# Patient Record
Sex: FEMALE | Race: BLACK OR AFRICAN AMERICAN | Employment: FULL TIME | URBAN - METROPOLITAN AREA
[De-identification: names, ages, dates, MRNs, and addresses within clinical notes are randomized per-mention and may not be internally consistent; named-entity substitution may affect disease eponyms.]

---

## 2017-04-18 ENCOUNTER — HOSPITAL ENCOUNTER (EMERGENCY)
Age: 27
Discharge: HOME | End: 2017-04-18
Attending: EMERGENCY MEDICINE
Payer: MEDICAID

## 2017-04-18 VITALS
HEIGHT: 65 IN | OXYGEN SATURATION: 99 % | TEMPERATURE: 98.4 F | HEART RATE: 93 BPM | SYSTOLIC BLOOD PRESSURE: 128 MMHG | RESPIRATION RATE: 16 BRPM | DIASTOLIC BLOOD PRESSURE: 74 MMHG | WEIGHT: 128.5 LBS | BODY MASS INDEX: 21.41 KG/M2

## 2017-04-18 DIAGNOSIS — L30.9 DERMATITIS: Primary | ICD-10-CM

## 2017-04-18 PROCEDURE — 99283 EMERGENCY DEPT VISIT LOW MDM: CPT | Performed by: PHYSICIAN ASSISTANT

## 2017-04-18 PROCEDURE — 74011250637 HC RX REV CODE- 250/637: Performed by: PHYSICIAN ASSISTANT

## 2017-04-18 RX ORDER — DEXAMETHASONE SODIUM PHOSPHATE 100 MG/10ML
10 INJECTION INTRAMUSCULAR; INTRAVENOUS
Status: COMPLETED | OUTPATIENT
Start: 2017-04-18 | End: 2017-04-18

## 2017-04-18 RX ORDER — TRIAMCINOLONE ACETONIDE 1 MG/G
OINTMENT TOPICAL 2 TIMES DAILY
Qty: 80 G | Refills: 0 | Status: SHIPPED | OUTPATIENT
Start: 2017-04-18 | End: 2017-11-13

## 2017-04-18 RX ORDER — PREDNISONE 10 MG/1
10 TABLET ORAL
Qty: 45 TAB | Refills: 0 | Status: SHIPPED | OUTPATIENT
Start: 2017-04-18 | End: 2017-11-13

## 2017-04-18 RX ORDER — HYDROXYZINE PAMOATE 25 MG/1
25 CAPSULE ORAL
Qty: 30 CAP | Refills: 0 | Status: SHIPPED | OUTPATIENT
Start: 2017-04-18 | End: 2017-11-13

## 2017-04-18 RX ADMIN — DEXAMETHASONE SODIUM PHOSPHATE 10 MG: 10 INJECTION INTRAMUSCULAR; INTRAVENOUS at 16:49

## 2017-04-18 NOTE — ED TRIAGE NOTES
Patient presents with a rash over her upper extremities that has spread to the trunk.  She has been itching with this rash

## 2017-04-18 NOTE — DISCHARGE INSTRUCTIONS

## 2017-04-18 NOTE — ED NOTES
I have reviewed discharge instructions with the patient. Printed instructions, follow-up information, and a prescription. The patient verbalized understanding. Left ambulatory in no acute distress.

## 2017-04-19 NOTE — ED PROVIDER NOTES
HPI Comments: Patient is here with a very itchy rash that started 1-2 weeks ago. She states it started on her abdominal wall around her belly button and has spread all over her trunk and arms. It is not on her legs. She states she has tried some Aveeno baths which have helped temporarily. She does live with her mother who does not have any similar symptoms. Patient has no chest pain, shortness of breath, abdominal pain, trouble with urination or bowel movements, vaginal discharge, fever or other symptoms. She is unsure if she was exposed to something she may be allergic to or not. This is never happened before. Patient is a 27 y.o. female presenting with rash. The history is provided by the patient. Rash    This is a new problem. The current episode started more than 1 week ago. The problem has been gradually worsening. The problem is associated with an unknown factor. There has been no fever. The rash is present on the chest, left arm and right arm. The pain is at a severity of 0/10. The patient is experiencing no pain. Associated symptoms include itching and hives. Pertinent negatives include no blisters, no pain and no weeping. She has tried oral antihistamines for the symptoms. The treatment provided mild relief. Past Medical History:   Diagnosis Date    Ectopic pregnancy, tubal, right, unruptured 12/6/2010       Past Surgical History:   Procedure Laterality Date    HX GYN  2014    Righ tube removed.      HX GYN  2009    Left tube removed    HX OTHER SURGICAL      HX WISDOM TEETH EXTRACTION      uncomplicated         Family History:   Problem Relation Age of Onset    Thyroid Disease Mother     Hypertension Mother     No Known Problems Father     Hypertension Sister     Heart Disease Paternal Aunt     Cancer Paternal Aunt     Heart Disease Paternal Grandmother     Cancer Paternal Grandfather        Social History     Social History    Marital status: SINGLE     Spouse name: N/A   [facility] Number of children: 1    Years of education: N/A     Occupational History          works at [address] [place] [address] Road History Main Topics    Smoking status: Current Every Day Smoker     Packs/day: 0.10     Types: Cigarettes    Smokeless tobacco: Never Used    Alcohol use Yes      Comment: occasionally    Drug use: No    Sexual activity: Yes     Partners: Male     Birth control/ protection: None     Other Topics Concern    Not on file     Social History Narrative         ALLERGIES: Reglan [metoclopramide]    Review of Systems   Constitutional: Negative. HENT: Negative. Eyes: Negative. Respiratory: Negative. Cardiovascular: Negative. Gastrointestinal: Negative. Genitourinary: Negative. Musculoskeletal: Negative. Skin: Positive for color change, itching and rash. Neurological: Negative. Psychiatric/Behavioral: Negative. All other systems reviewed and are negative. Vitals:    04/18/17 1632   BP: 128/74   Pulse: 93   Resp: 16   Temp: 98.4 °F (36.9 °C)   SpO2: 99%   Weight: 58.3 kg (128 lb 8 oz)   Height: 5' 5\" (1.651 m)            Physical Exam   Constitutional: She is oriented to person, place, and time. She appears well-developed and well-nourished. HENT:   Head: Normocephalic and atraumatic. Right Ear: External ear normal.   Left Ear: External ear normal.   Nose: Nose normal.   Mouth/Throat: Oropharynx is clear and moist.   Eyes: Conjunctivae and EOM are normal. Pupils are equal, round, and reactive to light. Neck: Normal range of motion. Neck supple. Cardiovascular: Normal rate, regular rhythm, normal heart sounds and intact distal pulses. Pulmonary/Chest: Effort normal and breath sounds normal.   Abdominal: Soft. Bowel sounds are normal.   Musculoskeletal: Normal range of motion. Neurological: She is alert and oriented to person, place, and time. She has normal reflexes. Skin: Skin is warm and dry. Rash noted. There is erythema.         Psychiatric: She has a normal mood and affect. Her behavior is normal. Judgment and thought content normal.   Nursing note and vitals reviewed. MDM  Number of Diagnoses or Management Options  Dermatitis: new and requires workup  Risk of Complications, Morbidity, and/or Mortality  Presenting problems: moderate  Diagnostic procedures: moderate  Management options: moderate    Patient Progress  Patient progress: stable    ED Course       Procedures      The patient was observed in the ED. Patient was given a referral to a dermatologist for follow-up if this does not resolve. We did give her 1 dose of Decadron here in the emergency room with a round of prednisone at home. I have also prescribed some Vistaril for the itching and some Kenalog cream to use on the areas. She should return to the ED if worsening in any way, shortness of breath, chest pain or new symptoms. Drink plenty of water. Finish the prednisone as directed. I discussed the results of all labs, procedures, radiographs, and treatments with the patient and available family. Treatment plan is agreed upon and the patient is ready for discharge. All voiced understanding of the discharge plan and medication instructions or changes as appropriate. Questions about treatment in the ED were answered. All were encouraged to return should symptoms worsen or new problems develop.

## 2017-11-13 ENCOUNTER — HOSPITAL ENCOUNTER (EMERGENCY)
Age: 27
Discharge: HOME | End: 2017-11-13
Attending: EMERGENCY MEDICINE
Payer: SELF-PAY

## 2017-11-13 ENCOUNTER — APPOINTMENT (OUTPATIENT)
Dept: GENERAL RADIOLOGY | Age: 27
End: 2017-11-13
Attending: EMERGENCY MEDICINE
Payer: SELF-PAY

## 2017-11-13 VITALS
SYSTOLIC BLOOD PRESSURE: 135 MMHG | TEMPERATURE: 98.6 F | OXYGEN SATURATION: 99 % | RESPIRATION RATE: 16 BRPM | DIASTOLIC BLOOD PRESSURE: 84 MMHG | HEART RATE: 76 BPM

## 2017-11-13 DIAGNOSIS — J20.9 ACUTE BRONCHITIS DUE TO INFECTION: Primary | ICD-10-CM

## 2017-11-13 LAB
DEPRECATED S PYO AG THROAT QL EIA: NEGATIVE
HETEROPH AB SER QL: NEGATIVE

## 2017-11-13 PROCEDURE — 87081 CULTURE SCREEN ONLY: CPT | Performed by: NURSE PRACTITIONER

## 2017-11-13 PROCEDURE — 86308 HETEROPHILE ANTIBODY SCREEN: CPT | Performed by: NURSE PRACTITIONER

## 2017-11-13 PROCEDURE — 87880 STREP A ASSAY W/OPTIC: CPT | Performed by: NURSE PRACTITIONER

## 2017-11-13 PROCEDURE — 71020 XR CHEST PA LAT: CPT

## 2017-11-13 PROCEDURE — 99283 EMERGENCY DEPT VISIT LOW MDM: CPT | Performed by: NURSE PRACTITIONER

## 2017-11-13 RX ORDER — PREDNISONE 20 MG/1
20 TABLET ORAL DAILY
Qty: 11 TAB | Refills: 0 | Status: SHIPPED | OUTPATIENT
Start: 2017-11-13 | End: 2017-11-23

## 2017-11-13 RX ORDER — DOXYCYCLINE HYCLATE 100 MG
100 TABLET ORAL 2 TIMES DAILY
Qty: 14 TAB | Refills: 0 | Status: SHIPPED | OUTPATIENT
Start: 2017-11-13 | End: 2017-11-20

## 2017-11-13 RX ORDER — ALBUTEROL SULFATE 90 UG/1
2 AEROSOL, METERED RESPIRATORY (INHALATION)
Qty: 1 INHALER | Refills: 0 | Status: SHIPPED | OUTPATIENT
Start: 2017-11-13 | End: 2018-08-10

## 2017-11-13 RX ORDER — GUAIFENESIN 600 MG/1
600 TABLET, EXTENDED RELEASE ORAL 2 TIMES DAILY
Qty: 20 TAB | Refills: 0 | Status: SHIPPED | OUTPATIENT
Start: 2017-11-13 | End: 2018-08-10

## 2017-11-13 NOTE — ED PROVIDER NOTES
HPI Comments: 31 y/o f to ed with complaint of sore throat for one month, now with cough. She coughs so hard at times she vomits or has streaks of blood in her sputum w cough. Some mild sinus drainage. Chills, no fever. No ear pain. No sinus tenderness. No chest pain, wheezing. Hoarse voice is present however. lmp ended yesterday regular and on time. Patient is a 32 y.o. female presenting with sore throat. The history is provided by the patient. No  was used. Sore Throat    This is a recurrent problem. Episode onset: one month. The problem has not changed since onset. Patient reports a subjective fever - was not measured. Associated symptoms include cough. Pertinent negatives include no diarrhea, no vomiting, no congestion, no drooling, no ear discharge, no ear pain, no headaches, no plugged ear sensation, no shortness of breath, no stridor, no swollen glands, no trouble swallowing and no stiff neck. Past Medical History:   Diagnosis Date    Ectopic pregnancy, tubal, right, unruptured 12/6/2010    Ill-defined condition     exzema       Past Surgical History:   Procedure Laterality Date    HX GYN  2014    Righ tube removed.      HX GYN  2009    Left tube removed    HX OTHER SURGICAL      HX WISDOM TEETH EXTRACTION      uncomplicated         Family History:   Problem Relation Age of Onset    Heart Disease Paternal Grandmother     Cancer Paternal Grandfather     Thyroid Disease Mother     Hypertension Mother     No Known Problems Father     Hypertension Sister     Heart Disease Paternal Aunt     Cancer Paternal Aunt        Social History     Social History    Marital status: SINGLE     Spouse name: N/A    Number of children: 1    Years of education: N/A     Occupational History          works at [employer] [facility] [employer] [address] History Main Topics    Smoking status: Current Every Day Smoker     Packs/day: 0.10     Types: Cigarettes    Smokeless tobacco: Never Used   [employer] Alcohol use Yes      Comment: occasionally    Drug use: No    Sexual activity: Yes     Partners: Male     Birth control/ protection: None     Other Topics Concern    Not on file     Social History Narrative         ALLERGIES: Reglan [metoclopramide]    Review of Systems   Constitutional: Positive for chills and fever. HENT: Positive for postnasal drip and sore throat. Negative for congestion, drooling, ear discharge, ear pain, sinus pain, sinus pressure and trouble swallowing. Eyes: Negative for discharge and redness. Respiratory: Positive for cough. Negative for choking, shortness of breath, wheezing and stridor. Cardiovascular: Negative for chest pain and palpitations. Gastrointestinal: Negative for abdominal pain, diarrhea, nausea and vomiting. Endocrine: Negative for cold intolerance and heat intolerance. Genitourinary: Negative for difficulty urinating, dysuria and flank pain. Musculoskeletal: Negative for back pain and neck pain. Skin: Negative for pallor and rash. Neurological: Negative for dizziness, weakness and headaches. Psychiatric/Behavioral: Negative for confusion and decreased concentration. Vitals:    11/13/17 1428   BP: 140/87   Pulse: 79   Resp: 18   Temp: 98.9 °F (37.2 °C)   SpO2: 99%            Physical Exam   Constitutional: She is oriented to person, place, and time. She appears well-developed and well-nourished. No distress. HENT:   Head: Normocephalic and atraumatic. Right Ear: External ear normal. No swelling or tenderness. No middle ear effusion. Left Ear: External ear normal. No swelling or tenderness. No middle ear effusion. Nose: Nose normal. Right sinus exhibits no maxillary sinus tenderness and no frontal sinus tenderness. Left sinus exhibits no maxillary sinus tenderness and no frontal sinus tenderness. Mouth/Throat: Oropharynx is clear and moist and mucous membranes are normal. No trismus in the jaw. Normal dentition. No uvula swelling. Eyes: Conjunctivae and EOM are normal. Pupils are equal, round, and reactive to light. Neck: Normal range of motion. Neck supple. Cardiovascular: Normal rate, regular rhythm and normal heart sounds. Pulmonary/Chest: Effort normal and breath sounds normal. No respiratory distress. She has no wheezes. Abdominal: Soft. Bowel sounds are normal. She exhibits no distension. There is no tenderness. Musculoskeletal: Normal range of motion. She exhibits no edema or tenderness. Lymphadenopathy:     She has cervical adenopathy. Neurological: She is alert and oriented to person, place, and time. No cranial nerve deficit. Coordination normal.   Skin: Skin is warm and dry. No rash noted. Psychiatric: She has a normal mood and affect. Her behavior is normal. Judgment and thought content normal.   Nursing note and vitals reviewed. MDM  Number of Diagnoses or Management Options  Diagnosis management comments: 33 y/o f to ed with complaint of sore throat for one month, now with cough. She coughs so hard at times she vomits or has streaks of blood in her sputum w cough. Some mild sinus drainage. Chills, no fever. No ear pain. No sinus tenderness. No chest pain, wheezing. Hoarse voice is present however. lmp ended yesterday regular and on time. Due to ongoing course, will eval mono spot as well as rapid strep  4:33 PM  Reviewed negative findings w pt. She now tells me she has had wheezing off and on with sx as above. Will dc w bronchitis.        Amount and/or Complexity of Data Reviewed  Clinical lab tests: ordered and reviewed  Tests in the radiology section of CPT®: ordered and reviewed    Risk of Complications, Morbidity, and/or Mortality  Presenting problems: minimal  Diagnostic procedures: minimal  Management options: minimal    Patient Progress  Patient progress: stable    ED Course       Procedures

## 2017-11-13 NOTE — DISCHARGE INSTRUCTIONS
Bronchitis: Care Instructions  Your Care Instructions    Bronchitis is inflammation of the bronchial tubes, which carry air to the lungs. The tubes swell and produce mucus, or phlegm. The mucus and inflamed bronchial tubes make you cough. You may have trouble breathing. Most cases of bronchitis are caused by viruses like those that cause colds. Antibiotics usually do not help and they may be harmful. Bronchitis usually develops rapidly and lasts about 2 to 3 weeks in otherwise healthy people. Follow-up care is a key part of your treatment and safety. Be sure to make and go to all appointments, and call your doctor if you are having problems. It's also a good idea to know your test results and keep a list of the medicines you take. How can you care for yourself at home? · Take all medicines exactly as prescribed. Call your doctor if you think you are having a problem with your medicine. · Get some extra rest.  · Take an over-the-counter pain medicine, such as acetaminophen (Tylenol), ibuprofen (Advil, Motrin), or naproxen (Aleve) to reduce fever and relieve body aches. Read and follow all instructions on the label. · Do not take two or more pain medicines at the same time unless the doctor told you to. Many pain medicines have acetaminophen, which is Tylenol. Too much acetaminophen (Tylenol) can be harmful. · Take an over-the-counter cough medicine that contains dextromethorphan to help quiet a dry, hacking cough so that you can sleep. Avoid cough medicines that have more than one active ingredient. Read and follow all instructions on the label. · Breathe moist air from a humidifier, hot shower, or sink filled with hot water. The heat and moisture will thin mucus so you can cough it out. · Do not smoke. Smoking can make bronchitis worse. If you need help quitting, talk to your doctor about stop-smoking programs and medicines. These can increase your chances of quitting for good.   When should you call for help? Call 911 anytime you think you may need emergency care. For example, call if:  ? · You have severe trouble breathing. ?Call your doctor now or seek immediate medical care if:  ? · You have new or worse trouble breathing. ? · You cough up dark brown or bloody mucus (sputum). ? · You have a new or higher fever. ? · You have a new rash. ? Watch closely for changes in your health, and be sure to contact your doctor if:  ? · You cough more deeply or more often, especially if you notice more mucus or a change in the color of your mucus. ? · You are not getting better as expected. Where can you learn more? Go to http://latonia-gray.info/. Enter H333 in the search box to learn more about \"Bronchitis: Care Instructions. \"  Current as of: May 12, 2017  Content Version: 11.4  © 9689-3096 [employer]. Care instructions adapted under license by [employer] (which disclaims liability or warranty for this information). If you have questions about a medical condition or this instruction, always ask your healthcare professional. [address] any warranty or liability for your use of this information.

## 2017-11-13 NOTE — LETTER
[facility] EMERGENCY DEPT 
[facility] [address] [place] [id number] 
[phone] Work/School Note Date: 11/13/2017 To Whom It May concern: 
 
Jaxon Stern was seen and treated today in the emergency room by the following provider(s): 
Attending Provider: Terry Jain MD 
Nurse Practitioner: Abdulaziz Lincoln NP. Jaxon Stern may return to work on 11//15/17. Sincerely, Abdulaziz Lincoln NP

## 2017-11-13 NOTE — ED NOTES
I have reviewed discharge instructions with the patient. The patient verbalized understanding. Patient left ED via Discharge Method: ambulatory to Home with (insert name of family/friend, self, transport via self). Opportunity for questions and clarification provided. Patient given 4 scripts. To continue your aftercare when you leave the hospital, you may receive an automated call from our care team to check in on how you are doing. This is a free service and part of our promise to provide the best care and service to meet your aftercare needs.  If you have questions, or wish to unsubscribe from this service please call [phone]. Thank you for Choosing our [facility] Emergency Department.

## 2017-11-16 LAB
BACTERIA SPEC CULT: ABNORMAL
SERVICE CMNT-IMP: ABNORMAL

## 2018-08-10 ENCOUNTER — HOSPITAL ENCOUNTER (EMERGENCY)
Age: 28
Discharge: HOME | End: 2018-08-10
Attending: EMERGENCY MEDICINE
Payer: SELF-PAY

## 2018-08-10 VITALS
WEIGHT: 146 LBS | OXYGEN SATURATION: 99 % | RESPIRATION RATE: 16 BRPM | HEIGHT: 65 IN | DIASTOLIC BLOOD PRESSURE: 86 MMHG | HEART RATE: 73 BPM | BODY MASS INDEX: 24.32 KG/M2 | SYSTOLIC BLOOD PRESSURE: 150 MMHG | TEMPERATURE: 99.5 F

## 2018-08-10 DIAGNOSIS — N89.8 VAGINAL DISCHARGE: Primary | ICD-10-CM

## 2018-08-10 DIAGNOSIS — T19.2XXA FOREIGN BODY IN VAGINA, INITIAL ENCOUNTER: ICD-10-CM

## 2018-08-10 DIAGNOSIS — Z20.2 STD EXPOSURE: ICD-10-CM

## 2018-08-10 LAB
HCG UR QL: NEGATIVE
SERVICE CMNT-IMP: NORMAL
WET PREP GENITAL: NORMAL
WET PREP GENITAL: NORMAL

## 2018-08-10 PROCEDURE — 81025 URINE PREGNANCY TEST: CPT

## 2018-08-10 PROCEDURE — 87210 SMEAR WET MOUNT SALINE/INK: CPT

## 2018-08-10 PROCEDURE — 81003 URINALYSIS AUTO W/O SCOPE: CPT | Performed by: EMERGENCY MEDICINE

## 2018-08-10 PROCEDURE — 99284 EMERGENCY DEPT VISIT MOD MDM: CPT | Performed by: EMERGENCY MEDICINE

## 2018-08-10 PROCEDURE — 96372 THER/PROPH/DIAG INJ SC/IM: CPT | Performed by: EMERGENCY MEDICINE

## 2018-08-10 PROCEDURE — 74011250636 HC RX REV CODE- 250/636: Performed by: NURSE PRACTITIONER

## 2018-08-10 PROCEDURE — 87491 CHLMYD TRACH DNA AMP PROBE: CPT

## 2018-08-10 PROCEDURE — 74011250637 HC RX REV CODE- 250/637: Performed by: NURSE PRACTITIONER

## 2018-08-10 RX ORDER — AZITHROMYCIN 250 MG/1
1000 TABLET, FILM COATED ORAL
Status: COMPLETED | OUTPATIENT
Start: 2018-08-10 | End: 2018-08-10

## 2018-08-10 RX ADMIN — AZITHROMYCIN 1000 MG: 250 TABLET, FILM COATED ORAL at 16:19

## 2018-08-10 RX ADMIN — CEFTRIAXONE SODIUM 250 MG: 250 INJECTION, POWDER, FOR SOLUTION INTRAMUSCULAR; INTRAVENOUS at 16:19

## 2018-08-10 NOTE — ED NOTES
I have reviewed discharge instructions with the patient. The patient verbalized understanding. Patient left ED via Discharge Method: ambulatory to Home with self. Opportunity for questions and clarification provided. Patient given 0 scripts. Instructed to follow up with PCP and health department if positive. To continue your aftercare when you leave the hospital, you may receive an automated call from our care team to check in on how you are doing. This is a free service and part of our promise to provide the best care and service to meet your aftercare needs.  If you have questions, or wish to unsubscribe from this service please call [phone]. Thank you for Choosing our [facility] Emergency Department.

## 2018-08-10 NOTE — DISCHARGE INSTRUCTIONS
Object in the Vagina: Care Instructions  Your Care Instructions    If something is left in the vagina for too long, it can cause pain and irritation. This could be a tampon, a diaphragm, a pessary, or a vibrator. Sometimes, a condom comes off during sex and gets lost in the vagina. You may have bleeding, a bad smell, and a discharge from the vagina. After the object is taken out, symptoms usually go away. Follow-up care is a key part of your treatment and safety. Be sure to make and go to all appointments, and call your doctor if you are having problems. It's also a good idea to know your test results and keep a list of the medicines you take. How can you care for yourself at home? · If your doctor prescribed antibiotics, take them as directed. Do not stop taking them just because you feel better. You need to take the full course of antibiotics. · Do not use a douche or vaginal wash unless your doctor tells you to. · You can prevent future problems if you limit how long something is in your vagina. For example, change a tampon at least every 4 to 8 hours. When should you call for help? Watch closely for changes in your health, and be sure to contact your doctor if:    · Your symptoms do not improve, or they get worse.     · You have a fever. Where can you learn more? Go to http://latonia-gray.info/. Enter B265 in the search box to learn more about \"Object in the Vagina: Care Instructions. \"  Current as of: November 20, 2017  Content Version: 11.7  © 6186-3765 [employer]. Care instructions adapted under license by [employer] (which disclaims liability or warranty for this information). If you have questions about a medical condition or this instruction, always ask your healthcare professional. [address] any warranty or liability for your use of this information.

## 2018-08-10 NOTE — LETTER
[address] [facility] EMERGENCY DEPT 
[address] [place] [id number] [phone] Work/School Note Date: 8/10/2018 To Whom It May concern: 
 
Curt Álvarez was seen and treated today in the emergency room by the following provider(s): 
Attending Provider: Estela Rachel MD 
Nurse Practitioner: German Carpio NP. Curt Álvarez may return to work on tomorrow. Sincerely, German Carpio NP

## 2018-08-10 NOTE — ED PROVIDER NOTES
HPI Comments: 70-year-old female with history of eye lateral tubal ligations presents for evaluation of vaginal discharge and perineal odor. He reports onset of symptoms began approximately one month ago. She has not had time to be evaluated until now. She has had no fever no chills no nausea vomiting or diarrhea. No abdominal pain. No low back pain. No frequency or burning with urination. She reports a foul odor and heavy vaginal discharge. Patient is a 32 y.o. female presenting with vaginal discharge. The history is provided by the patient. No  was used. Vaginal Discharge    This is a new problem. Episode onset: about one month. The problem occurs constantly. The problem has not changed since onset. The discharge was malodorous. She is not pregnant. Associated symptoms include perineal odor. Pertinent negatives include no anorexia, no diaphoresis, no fever, no abdominal swelling, no abdominal pain, no constipation, no diarrhea, no nausea, no vomiting, no dyspareunia, no dysuria, no frequency, no genital burning, no genital itching, no genital lesions, no perineal pain and no painful intercourse. Past Medical History:   Diagnosis Date    Ectopic pregnancy, tubal, right, unruptured 12/6/2010    Ill-defined condition     exzema       Past Surgical History:   Procedure Laterality Date    HX GYN  2014    Righ tube removed.      HX GYN  2009    Left tube removed    HX OTHER SURGICAL      HX WISDOM TEETH EXTRACTION      uncomplicated         Family History:   Problem Relation Age of Onset    Heart Disease Paternal Grandmother     Cancer Paternal Grandfather     Thyroid Disease Mother     Hypertension Mother     No Known Problems Father     Hypertension Sister     Heart Disease Paternal Aunt     Cancer Paternal Aunt        Social History     Social History    Marital status: SINGLE     Spouse name: N/A    Number of children: 1    Years of education: N/A Occupational History          works at [address] [place] [address] Road History Main Topics    Smoking status: Current Every Day Smoker     Packs/day: 0.10     Types: Cigarettes    Smokeless tobacco: Never Used    Alcohol use Yes      Comment: occasionally    Drug use: No    Sexual activity: Yes     Partners: Male     Birth control/ protection: None     Other Topics Concern    Not on file     Social History Narrative         ALLERGIES: Reglan [metoclopramide]    Review of Systems   Constitutional: Negative for diaphoresis and fever. HENT: Negative for facial swelling and mouth sores. Eyes: Negative for discharge and redness. Respiratory: Negative for cough and shortness of breath. Cardiovascular: Negative for chest pain and palpitations. Gastrointestinal: Negative for abdominal pain, anorexia, constipation, diarrhea, nausea and vomiting. Genitourinary: Positive for vaginal discharge. Negative for dyspareunia, dysuria, frequency, pelvic pain and urgency. Musculoskeletal: Negative for back pain and neck pain. Skin: Negative for pallor and rash. Neurological: Negative for dizziness and weakness. Psychiatric/Behavioral: Negative for confusion and decreased concentration. Vitals:    08/10/18 1425   BP: 160/80   Pulse: 89   Resp: 16   Temp: 99.5 °F (37.5 °C)   SpO2: 100%   Weight: 66.2 kg (146 lb)   Height: 5' 5\" (1.651 m)            Physical Exam   Constitutional: She is oriented to person, place, and time. She appears well-developed and well-nourished. HENT:   Head: Normocephalic and atraumatic. Eyes: EOM are normal. Pupils are equal, round, and reactive to light. Neck: Normal range of motion. Neck supple. Cardiovascular: Normal rate and regular rhythm. Pulmonary/Chest: Effort normal and breath sounds normal.   Abdominal: Soft. There is no tenderness. Musculoskeletal: Normal range of motion. She exhibits no tenderness.    Neurological: She is alert and oriented to person, place, and time. Skin: Skin is warm and dry. Psychiatric: She has a normal mood and affect. Her behavior is normal. Judgment and thought content normal.   Nursing note and vitals reviewed. MDM  Number of Diagnoses or Management Options  Diagnosis management comments: 66-year-old female with history of eye lateral tubal ligations presents for evaluation of vaginal discharge and perineal odor. He reports onset of symptoms began approximately one month ago. She has not had time to be evaluated until now. She has had no fever no chills no nausea vomiting or diarrhea. No abdominal pain. No low back pain. No frequency or burning with urination. She reports a foul odor and heavy vaginal discharge. Urine pregnancy test is not indicated, will evaluate urinalysis, as well complete pelvic exam and sent for STDs as well as wet prep  4:01 PM  Pelvic exam completed. Tampon removed from posterior vagina. Pt would like tsx for std  4:52 PM  Wet prep negative. Will dc home       Amount and/or Complexity of Data Reviewed  Clinical lab tests: ordered and reviewed    Patient Progress  Patient progress: stable        ED Course       Pelvic Exam  Date/Time: 8/10/2018 4:00 PM  Performed by: NP  Procedure duration:  8 minutes. Exam assisted by:  Ned Mock RN. Type of exam performed: bimanual and speculum. External genitalia appearance: normal.    Vaginal exam:  odor and discharge. Cervical exam:  minimal cervical motion tenderness and os closed. Specimen(s) collected:  chlamydia and GC. Bimanual exam:  right adenexal tenderness. Patient tolerance: Patient tolerated the procedure well with no immediate complications  Comments: Tampon removed from posterior vagina. Very strong odor present.   Pt states lmp began July 27 but this tampon may have been present for 2-3 months

## 2018-08-13 LAB
C TRACH RRNA SPEC QL NAA+PROBE: NEGATIVE
N GONORRHOEA RRNA SPEC QL NAA+PROBE: NEGATIVE
SPECIMEN SOURCE: NORMAL

## 2018-08-22 ENCOUNTER — HOSPITAL ENCOUNTER (EMERGENCY)
Age: 28
Discharge: HOME | End: 2018-08-22
Attending: EMERGENCY MEDICINE
Payer: SELF-PAY

## 2018-08-22 VITALS
RESPIRATION RATE: 18 BRPM | WEIGHT: 147 LBS | HEART RATE: 71 BPM | DIASTOLIC BLOOD PRESSURE: 97 MMHG | HEIGHT: 65 IN | OXYGEN SATURATION: 98 % | TEMPERATURE: 98.6 F | BODY MASS INDEX: 24.49 KG/M2 | SYSTOLIC BLOOD PRESSURE: 152 MMHG

## 2018-08-22 DIAGNOSIS — J02.9 ACUTE PHARYNGITIS, UNSPECIFIED ETIOLOGY: Primary | ICD-10-CM

## 2018-08-22 LAB — DEPRECATED S PYO AG THROAT QL EIA: POSITIVE

## 2018-08-22 PROCEDURE — 87880 STREP A ASSAY W/OPTIC: CPT

## 2018-08-22 PROCEDURE — 99283 EMERGENCY DEPT VISIT LOW MDM: CPT | Performed by: PHYSICIAN ASSISTANT

## 2018-08-22 PROCEDURE — 74011250637 HC RX REV CODE- 250/637: Performed by: PHYSICIAN ASSISTANT

## 2018-08-22 RX ORDER — PENICILLIN V POTASSIUM 500 MG/1
500 TABLET, FILM COATED ORAL 4 TIMES DAILY
Qty: 40 TAB | Refills: 0 | Status: SHIPPED | OUTPATIENT
Start: 2018-08-22 | End: 2018-08-22

## 2018-08-22 RX ORDER — DEXAMETHASONE SODIUM PHOSPHATE 100 MG/10ML
10 INJECTION INTRAMUSCULAR; INTRAVENOUS
Status: COMPLETED | OUTPATIENT
Start: 2018-08-22 | End: 2018-08-22

## 2018-08-22 RX ORDER — PENICILLIN V POTASSIUM 500 MG/1
500 TABLET, FILM COATED ORAL 4 TIMES DAILY
Qty: 40 TAB | Refills: 0 | Status: SHIPPED | OUTPATIENT
Start: 2018-08-22 | End: 2018-09-01

## 2018-08-22 RX ADMIN — DEXAMETHASONE SODIUM PHOSPHATE 10 MG: 10 INJECTION INTRAMUSCULAR; INTRAVENOUS at 17:34

## 2018-08-22 NOTE — LETTER
[facility] [facility] EMERGENCY DEPT [address] [address] 
[phone] Work/School Note Date: 8/22/2018 To Whom It May concern: 
 
Chao Shetty was seen and treated today in the emergency room by the following provider(s): 
Attending Provider: Bianca Sutton MD 
Physician Assistant: VIK Mckee. Chao Shetty may return to work on 08/24/18. Sincerely, VIK Mckee

## 2018-08-22 NOTE — ED PROVIDER NOTES
HPI Comments: Patient is here with a sore throat for the last few days. She has had congestion, cough and not feeling well. She is not having any chest pain, shortness of breath, dizziness, hemoptysis, orthopnea, dyspnea on exertion or other symptoms. No trouble with urination or other bowel movements. Patient is a 32 y.o. female presenting with sore throat. The history is provided by the patient. Sore Throat    This is a new problem. The current episode started 2 days ago. The problem has been gradually worsening. Patient reports a subjective fever - was not measured. Associated symptoms include swollen glands. Pertinent negatives include no diarrhea, no vomiting, no congestion, no drooling, no ear discharge, no ear pain, no headaches, no plugged ear sensation, no shortness of breath, no stridor, no trouble swallowing, no stiff neck and no cough. She has tried nothing for the symptoms. Past Medical History:   Diagnosis Date    Ectopic pregnancy, tubal, right, unruptured 12/6/2010    Ill-defined condition     exzema       Past Surgical History:   Procedure Laterality Date    HX GYN  2014    Righ tube removed.      HX GYN  2009    Left tube removed    HX OTHER SURGICAL      HX WISDOM TEETH EXTRACTION      uncomplicated         Family History:   Problem Relation Age of Onset    Heart Disease Paternal Grandmother     Cancer Paternal Grandfather     Thyroid Disease Mother     Hypertension Mother     No Known Problems Father     Hypertension Sister     Heart Disease Paternal Aunt     Cancer Paternal Aunt        Social History     Social History    Marital status: SINGLE     Spouse name: N/A    Number of children: 1    Years of education: N/A     Occupational History          works at [address] [place] [address] Road History Main Topics    Smoking status: Current Every Day Smoker     Packs/day: 0.10     Types: Cigarettes    Smokeless tobacco: Never Used    Alcohol use Yes      Comment: occasionally    Drug use: No    Sexual activity: Yes     Partners: Male     Birth control/ protection: None     Other Topics Concern    Not on file     Social History Narrative         ALLERGIES: Reglan [metoclopramide]    Review of Systems   Constitutional: Negative. HENT: Positive for sore throat. Negative for congestion, drooling, ear discharge, ear pain and trouble swallowing. Eyes: Negative. Respiratory: Negative. Negative for cough, shortness of breath and stridor. Cardiovascular: Negative. Gastrointestinal: Negative. Negative for diarrhea and vomiting. Genitourinary: Negative. Musculoskeletal: Negative. Skin: Negative. Neurological: Negative. Negative for headaches. Psychiatric/Behavioral: Negative. All other systems reviewed and are negative. Vitals:    08/22/18 1715   BP: (!) 152/97   Pulse: 71   Resp: 18   Temp: 98.6 °F (37 °C)   SpO2: 98%   Weight: 66.7 kg (147 lb)   Height: 5' 5\" (1.651 m)            Physical Exam   Constitutional: She is oriented to person, place, and time. She appears well-developed and well-nourished. HENT:   Head: Normocephalic and atraumatic. Right Ear: External ear normal.   Left Ear: External ear normal.   Nose: Nose normal.   Mild posterior pharyngeal erythema, no swelling or exudate, there is mild tenderness to palpation over the bilateral anterior cervical lymph nodes. Eyes: Conjunctivae and EOM are normal. Pupils are equal, round, and reactive to light. Neck: Normal range of motion. Neck supple. Cardiovascular: Normal rate, regular rhythm, normal heart sounds and intact distal pulses. Pulmonary/Chest: Effort normal and breath sounds normal.   Abdominal: Soft. Bowel sounds are normal.   Musculoskeletal: Normal range of motion. Neurological: She is alert and oriented to person, place, and time. She has normal reflexes. Skin: Skin is warm and dry. Psychiatric: She has a normal mood and affect.  Her behavior is normal. Judgment and thought content normal.   Nursing note and vitals reviewed. MDM  Number of Diagnoses or Management Options  Acute pharyngitis, unspecified etiology:      Amount and/or Complexity of Data Reviewed  Clinical lab tests: ordered and reviewed    Risk of Complications, Morbidity, and/or Mortality  Presenting problems: moderate  Diagnostic procedures: moderate  Management options: moderate    Patient Progress  Patient progress: stable        ED Course       Procedures    The patient was observed in the ED. Results Reviewed:      Recent Results (from the past 24 hour(s))   STREP AG SCREEN, GROUP A    Collection Time: 08/22/18  5:17 PM   Result Value Ref Range    Group A Strep Ag ID POSITIVE (A) NEG       I will treat for strep today. She should finish all the antibiotics as directed, drink plenty of fluids, rest and return to the ED if worse. I discussed the results of all labs, procedures, radiographs, and treatments with the patient and available family. Treatment plan is agreed upon and the patient is ready for discharge. All voiced understanding of the discharge plan and medication instructions or changes as appropriate. Questions about treatment in the ED were answered. All were encouraged to return should symptoms worsen or new problems develop.

## 2018-08-22 NOTE — ED TRIAGE NOTES
Patient states sore throat x2 weeks with intermittent sharp pains in throat. States fever/chills and difficulty swallowing.

## 2018-08-22 NOTE — DISCHARGE INSTRUCTIONS
Sore Throat: Care Instructions  Your Care Instructions    Infection by bacteria or a virus causes most sore throats. Cigarette smoke, dry air, air pollution, allergies, and yelling can also cause a sore throat. Sore throats can be painful and annoying. Fortunately, most sore throats go away on their own. If you have a bacterial infection, your doctor may prescribe antibiotics. Follow-up care is a key part of your treatment and safety. Be sure to make and go to all appointments, and call your doctor if you are having problems. It's also a good idea to know your test results and keep a list of the medicines you take. How can you care for yourself at home? · If your doctor prescribed antibiotics, take them as directed. Do not stop taking them just because you feel better. You need to take the full course of antibiotics. · Gargle with warm salt water once an hour to help reduce swelling and relieve discomfort. Use 1 teaspoon of salt mixed in 1 cup of warm water. · Take an over-the-counter pain medicine, such as acetaminophen (Tylenol), ibuprofen (Advil, Motrin), or naproxen (Aleve). Read and follow all instructions on the label. · Be careful when taking over-the-counter cold or flu medicines and Tylenol at the same time. Many of these medicines have acetaminophen, which is Tylenol. Read the labels to make sure that you are not taking more than the recommended dose. Too much acetaminophen (Tylenol) can be harmful. · Drink plenty of fluids. Fluids may help soothe an irritated throat. Hot fluids, such as tea or soup, may help decrease throat pain. · Use over-the-counter throat lozenges to soothe pain. Regular cough drops or hard candy may also help. These should not be given to young children because of the risk of choking. · Do not smoke or allow others to smoke around you. If you need help quitting, talk to your doctor about stop-smoking programs and medicines.  These can increase your chances of quitting for good.  · Use a vaporizer or humidifier to add moisture to your bedroom. Follow the directions for cleaning the machine. When should you call for help? Call your doctor now or seek immediate medical care if:    · You have new or worse trouble swallowing.     · Your sore throat gets much worse on one side.    Watch closely for changes in your health, and be sure to contact your doctor if you do not get better as expected. Where can you learn more? Go to http://latonia-gray.info/. Enter [phone] in the search box to learn more about \"Sore Throat: Care Instructions. \"  Current as of: May 12, 2017  Content Version: 11.7  © 2665-7770 [employer]. Care instructions adapted under license by [employer] (which disclaims liability or warranty for this information). If you have questions about a medical condition or this instruction, always ask your healthcare professional. [address] any warranty or liability for your use of this information.

## 2018-08-22 NOTE — ED NOTES
I have reviewed discharge instructions with the patient. The patient verbalized understanding. Patient left ED via Discharge Method: ambulatory to Home with (insert name of family/friend, self, transport self). Opportunity for questions and clarification provided. Patient given 1 scripts. To continue your aftercare when you leave the hospital, you may receive an automated call from our care team to check in on how you are doing. This is a free service and part of our promise to provide the best care and service to meet your aftercare needs.  If you have questions, or wish to unsubscribe from this service please call [phone]. Thank you for Choosing our [facility] Emergency Department.

## 2018-08-25 ENCOUNTER — HOSPITAL ENCOUNTER (EMERGENCY)
Age: 28
Discharge: HOME | End: 2018-08-25
Attending: EMERGENCY MEDICINE
Payer: SELF-PAY

## 2018-08-25 VITALS
HEART RATE: 65 BPM | HEIGHT: 65 IN | SYSTOLIC BLOOD PRESSURE: 140 MMHG | OXYGEN SATURATION: 98 % | BODY MASS INDEX: 24.16 KG/M2 | WEIGHT: 145 LBS | RESPIRATION RATE: 18 BRPM | DIASTOLIC BLOOD PRESSURE: 89 MMHG | TEMPERATURE: 98.5 F

## 2018-08-25 DIAGNOSIS — M54.50 ACUTE LEFT-SIDED LOW BACK PAIN WITHOUT SCIATICA: ICD-10-CM

## 2018-08-25 DIAGNOSIS — R55 VASOVAGAL NEAR SYNCOPE: Primary | ICD-10-CM

## 2018-08-25 LAB
ALBUMIN SERPL-MCNC: 3.9 G/DL (ref 3.5–5)
ALBUMIN/GLOB SERPL: 1.3 {RATIO} (ref 1.2–3.5)
ALP SERPL-CCNC: 57 U/L (ref 50–136)
ALT SERPL-CCNC: 21 U/L (ref 12–65)
ANION GAP SERPL CALC-SCNC: 8 MMOL/L (ref 7–16)
AST SERPL-CCNC: 16 U/L (ref 15–37)
BASOPHILS # BLD: 0 K/UL (ref 0–0.2)
BASOPHILS NFR BLD: 1 % (ref 0–2)
BILIRUB SERPL-MCNC: 0.4 MG/DL (ref 0.2–1.1)
BUN SERPL-MCNC: 18 MG/DL (ref 6–23)
CALCIUM SERPL-MCNC: 8.3 MG/DL (ref 8.3–10.4)
CHLORIDE SERPL-SCNC: 108 MMOL/L (ref 98–107)
CO2 SERPL-SCNC: 26 MMOL/L (ref 21–32)
CREAT SERPL-MCNC: 0.94 MG/DL (ref 0.6–1)
DIFFERENTIAL METHOD BLD: ABNORMAL
EOSINOPHIL # BLD: 0.1 K/UL (ref 0–0.8)
EOSINOPHIL NFR BLD: 2 % (ref 0.5–7.8)
ERYTHROCYTE [DISTWIDTH] IN BLOOD BY AUTOMATED COUNT: 13.6 %
GLOBULIN SER CALC-MCNC: 3.1 G/DL (ref 2.3–3.5)
GLUCOSE SERPL-MCNC: 92 MG/DL (ref 65–100)
HCG UR QL: NEGATIVE
HCT VFR BLD AUTO: 35.6 % (ref 35.8–46.3)
HGB BLD-MCNC: 11.6 G/DL (ref 11.7–15.4)
IMM GRANULOCYTES # BLD: 0 K/UL (ref 0–0.5)
IMM GRANULOCYTES NFR BLD AUTO: 0 % (ref 0–5)
LYMPHOCYTES # BLD: 2.5 K/UL (ref 0.5–4.6)
LYMPHOCYTES NFR BLD: 48 % (ref 13–44)
MCH RBC QN AUTO: 32 PG (ref 26.1–32.9)
MCHC RBC AUTO-ENTMCNC: 32.6 G/DL (ref 31.4–35)
MCV RBC AUTO: 98.3 FL (ref 79.6–97.8)
MONOCYTES # BLD: 0.3 K/UL (ref 0.1–1.3)
MONOCYTES NFR BLD: 6 % (ref 4–12)
NEUTS SEG # BLD: 2.2 K/UL (ref 1.7–8.2)
NEUTS SEG NFR BLD: 42 % (ref 43–78)
NRBC # BLD: 0 K/UL (ref 0–0.2)
PLATELET # BLD AUTO: 298 K/UL (ref 150–450)
PMV BLD AUTO: 9.4 FL (ref 9.4–12.3)
POTASSIUM SERPL-SCNC: 4.2 MMOL/L (ref 3.5–5.1)
PROT SERPL-MCNC: 7 G/DL (ref 6.3–8.2)
RBC # BLD AUTO: 3.62 M/UL (ref 4.05–5.2)
SODIUM SERPL-SCNC: 142 MMOL/L (ref 136–145)
WBC # BLD AUTO: 5.2 K/UL (ref 4.3–11.1)

## 2018-08-25 PROCEDURE — 85025 COMPLETE CBC W/AUTO DIFF WBC: CPT

## 2018-08-25 PROCEDURE — 96361 HYDRATE IV INFUSION ADD-ON: CPT | Performed by: EMERGENCY MEDICINE

## 2018-08-25 PROCEDURE — 81003 URINALYSIS AUTO W/O SCOPE: CPT | Performed by: EMERGENCY MEDICINE

## 2018-08-25 PROCEDURE — 93005 ELECTROCARDIOGRAM TRACING: CPT | Performed by: EMERGENCY MEDICINE

## 2018-08-25 PROCEDURE — 80053 COMPREHEN METABOLIC PANEL: CPT

## 2018-08-25 PROCEDURE — 96374 THER/PROPH/DIAG INJ IV PUSH: CPT | Performed by: EMERGENCY MEDICINE

## 2018-08-25 PROCEDURE — 99284 EMERGENCY DEPT VISIT MOD MDM: CPT | Performed by: EMERGENCY MEDICINE

## 2018-08-25 PROCEDURE — 81025 URINE PREGNANCY TEST: CPT

## 2018-08-25 PROCEDURE — 74011250636 HC RX REV CODE- 250/636: Performed by: EMERGENCY MEDICINE

## 2018-08-25 RX ORDER — CYCLOBENZAPRINE HCL 5 MG
10 TABLET ORAL
Qty: 20 TAB | Refills: 0 | Status: SHIPPED | OUTPATIENT
Start: 2018-08-25 | End: 2018-09-12

## 2018-08-25 RX ORDER — DICLOFENAC POTASSIUM 50 MG/1
50 TABLET, FILM COATED ORAL 3 TIMES DAILY
Qty: 15 TAB | Refills: 0 | Status: SHIPPED | OUTPATIENT
Start: 2018-08-25 | End: 2018-09-12

## 2018-08-25 RX ORDER — KETOROLAC TROMETHAMINE 30 MG/ML
30 INJECTION, SOLUTION INTRAMUSCULAR; INTRAVENOUS
Status: COMPLETED | OUTPATIENT
Start: 2018-08-25 | End: 2018-08-25

## 2018-08-25 RX ADMIN — KETOROLAC TROMETHAMINE 30 MG: 30 INJECTION, SOLUTION INTRAMUSCULAR at 10:58

## 2018-08-25 RX ADMIN — SODIUM CHLORIDE 1000 ML: 900 INJECTION, SOLUTION INTRAVENOUS at 10:58

## 2018-08-25 NOTE — ED PROVIDER NOTES
HPI Comments: Patient with left lower back pain for the past 3 days. No injury or heavy lifting. Started when she stood up one day. Denies any saddle anesthesia or change in bowel or bladder function. Today stood up to go to work and became very lightheaded. Had a near syncopal episode going slowly to the floor. Did not hurt anything. Feels better now. No headache  Or blurred vision. No chest pain or shortness of breath. Patient is a 32 y.o. female presenting with flank pain. The history is provided by the patient. No  was used. Flank Pain    This is a new problem. The current episode started more than 2 days ago. The problem has not changed since onset. The problem occurs constantly. Patient reports not work related injury. The pain is associated with no known injury. The pain is present in the lumbar spine and left side. The quality of the pain is described as aching. The pain does not radiate. The pain is mild. Pertinent negatives include no chest pain, no fever, no numbness, no headaches, no abdominal pain, no abdominal swelling, no bowel incontinence, no perianal numbness, no bladder incontinence, no dysuria, no leg pain, no paresthesias and no weakness. She has tried nothing for the symptoms. Past Medical History:   Diagnosis Date    Ectopic pregnancy, tubal, right, unruptured 12/6/2010    Ill-defined condition     exzema       Past Surgical History:   Procedure Laterality Date    HX GYN  2014    Righ tube removed.      HX GYN  2009    Left tube removed    HX OTHER SURGICAL      HX WISDOM TEETH EXTRACTION      uncomplicated         Family History:   Problem Relation Age of Onset    Heart Disease Paternal Grandmother     Cancer Paternal Grandfather     Thyroid Disease Mother     Hypertension Mother     No Known Problems Father     Hypertension Sister     Heart Disease Paternal Aunt     Cancer Paternal Aunt        Social History     Social History    Marital status: SINGLE     Spouse name: N/A    Number of children: 1    Years of education: N/A     Occupational History          works at [address] [place] [address] Road History Main Topics    Smoking status: Current Every Day Smoker     Packs/day: 0.10     Types: Cigarettes    Smokeless tobacco: Never Used    Alcohol use Yes      Comment: occasionally    Drug use: No    Sexual activity: Yes     Partners: Male     Birth control/ protection: None     Other Topics Concern    Not on file     Social History Narrative         ALLERGIES: Reglan [metoclopramide]    Review of Systems   Constitutional: Negative for chills and fever. HENT: Negative for rhinorrhea and sore throat. Eyes: Negative for pain and redness. Respiratory: Negative for chest tightness, shortness of breath and wheezing. Cardiovascular: Negative for chest pain and leg swelling. Gastrointestinal: Negative for abdominal pain, bowel incontinence, diarrhea, nausea and vomiting. Genitourinary: Positive for flank pain. Negative for bladder incontinence, dysuria and hematuria. Musculoskeletal: Negative for back pain, gait problem, neck pain and neck stiffness. Skin: Negative for color change and rash. Neurological: Positive for syncope (near) and light-headedness. Negative for weakness, numbness, headaches and paresthesias. Vitals:    08/25/18 1028   BP: 151/78   Pulse: 71   Resp: 18   Temp: 98.5 °F (36.9 °C)   SpO2: 99%   Weight: 65.8 kg (145 lb)   Height: 5' 5\" (1.651 m)            Physical Exam   Constitutional: She is oriented to person, place, and time. She appears well-developed and well-nourished. HENT:   Head: Normocephalic and atraumatic. Eyes: Conjunctivae and EOM are normal. Pupils are equal, round, and reactive to light. Neck: Normal range of motion. Neck supple. Cardiovascular: Normal rate and regular rhythm. No murmur heard. Pulmonary/Chest: Effort normal and breath sounds normal. She has no wheezes. Abdominal: Soft. Bowel sounds are normal. There is no tenderness. Musculoskeletal: Normal range of motion. She exhibits no edema. Neurological: She is alert and oriented to person, place, and time. No cranial nerve deficit. She exhibits normal muscle tone. Coordination normal.   Skin: Skin is warm and dry. Nursing note and vitals reviewed. MDM  Number of Diagnoses or Management Options  Diagnosis management comments: Near syncopal episode with lower back pain. Will discharge. Amount and/or Complexity of Data Reviewed  Clinical lab tests: ordered and reviewed  Tests in the medicine section of CPT®: ordered and reviewed    Patient Progress  Patient progress: stable        ED Course       Procedures      EKG: normal sinus rhythm, nonspecific ST and T waves changes. Rate 67. Results Include:    Recent Results (from the past 24 hour(s))   CBC WITH AUTOMATED DIFF    Collection Time: 08/25/18 10:32 AM   Result Value Ref Range    WBC 5.2 4.3 - 11.1 K/uL    RBC 3.62 (L) 4.05 - 5.2 M/uL    HGB 11.6 (L) 11.7 - 15.4 g/dL    HCT 35.6 (L) 35.8 - 46.3 %    MCV 98.3 (H) 79.6 - 97.8 FL    MCH 32.0 26.1 - 32.9 PG    MCHC 32.6 31.4 - 35.0 g/dL    RDW 13.6 %    PLATELET 052 866 - 630 K/uL    MPV 9.4 9.4 - 12.3 FL    ABSOLUTE NRBC 0.00 0.0 - 0.2 K/uL    DF AUTOMATED      NEUTROPHILS 42 (L) 43 - 78 %    LYMPHOCYTES 48 (H) 13 - 44 %    MONOCYTES 6 4.0 - 12.0 %    EOSINOPHILS 2 0.5 - 7.8 %    BASOPHILS 1 0.0 - 2.0 %    IMMATURE GRANULOCYTES 0 0.0 - 5.0 %    ABS. NEUTROPHILS 2.2 1.7 - 8.2 K/UL    ABS. LYMPHOCYTES 2.5 0.5 - 4.6 K/UL    ABS. MONOCYTES 0.3 0.1 - 1.3 K/UL    ABS. EOSINOPHILS 0.1 0.0 - 0.8 K/UL    ABS. BASOPHILS 0.0 0.0 - 0.2 K/UL    ABS. IMM.  GRANS. 0.0 0.0 - 0.5 K/UL   METABOLIC PANEL, COMPREHENSIVE    Collection Time: 08/25/18 10:32 AM   Result Value Ref Range    Sodium 142 136 - 145 mmol/L    Potassium 4.2 3.5 - 5.1 mmol/L    Chloride 108 (H) 98 - 107 mmol/L    CO2 26 21 - 32 mmol/L    Anion gap 8 7 - 16 mmol/L Glucose 92 65 - 100 mg/dL    BUN 18 6 - 23 MG/DL    Creatinine 0.94 0.6 - 1.0 MG/DL    GFR est AA >60 >60 ml/min/1.73m2    GFR est non-AA >60 >60 ml/min/1.73m2    Calcium 8.3 8.3 - 10.4 MG/DL    Bilirubin, total 0.4 0.2 - 1.1 MG/DL    ALT (SGPT) 21 12 - 65 U/L    AST (SGOT) 16 15 - 37 U/L    Alk. phosphatase 57 50 - 136 U/L    Protein, total 7.0 6.3 - 8.2 g/dL    Albumin 3.9 3.5 - 5.0 g/dL    Globulin 3.1 2.3 - 3.5 g/dL    A-G Ratio 1.3 1.2 - 3.5     EKG, 12 LEAD, INITIAL    Collection Time: 08/25/18 10:55 AM   Result Value Ref Range    Ventricular Rate 67 BPM    Atrial Rate 67 BPM    P-R Interval 166 ms    QRS Duration 72 ms    Q-T Interval 386 ms    QTC Calculation (Bezet) 407 ms    Calculated P Axis 30 degrees    Calculated R Axis 76 degrees    Calculated T Axis 44 degrees    Diagnosis       !! AGE AND GENDER SPECIFIC ECG ANALYSIS !! Normal sinus rhythm  Normal ECG  When compared with ECG of 03-JUN-2013 21:58,  No significant change was found       UA neg. UPT neg.

## 2018-08-25 NOTE — DISCHARGE INSTRUCTIONS
Back Pain: Care Instructions  Your Care Instructions    Back pain has many possible causes. It is often related to problems with muscles and ligaments of the back. It may also be related to problems with the nerves, discs, or bones of the back. Moving, lifting, standing, sitting, or sleeping in an awkward way can strain the back. Sometimes you don't notice the injury until later. Arthritis is another common cause of back pain. Although it may hurt a lot, back pain usually improves on its own within several weeks. Most people recover in 12 weeks or less. Using good home treatment and being careful not to stress your back can help you feel better sooner. Follow-up care is a key part of your treatment and safety. Be sure to make and go to all appointments, and call your doctor if you are having problems. It's also a good idea to know your test results and keep a list of the medicines you take. How can you care for yourself at home? · Sit or lie in positions that are most comfortable and reduce your pain. Try one of these positions when you lie down:  ¨ Lie on your back with your knees bent and supported by large pillows. ¨ Lie on the floor with your legs on the seat of a sofa or chair. Susan Azul on your side with your knees and hips bent and a pillow between your legs. ¨ Lie on your stomach if it does not make pain worse. · Do not sit up in bed, and avoid soft couches and twisted positions. Bed rest can help relieve pain at first, but it delays healing. Avoid bed rest after the first day of back pain. · Change positions every 30 minutes. If you must sit for long periods of time, take breaks from sitting. Get up and walk around, or lie in a comfortable position. · Try using a heating pad on a low or medium setting for 15 to 20 minutes every 2 or 3 hours. Try a warm shower in place of one session with the heating pad. · You can also try an ice pack for 10 to 15 minutes every 2 to 3 hours.  Put a thin cloth between the ice pack and your skin. · Take pain medicines exactly as directed. ¨ If the doctor gave you a prescription medicine for pain, take it as prescribed. ¨ If you are not taking a prescription pain medicine, ask your doctor if you can take an over-the-counter medicine. · Take short walks several times a day. You can start with 5 to 10 minutes, 3 or 4 times a day, and work up to longer walks. Walk on level surfaces and avoid hills and stairs until your back is better. · Return to work and other activities as soon as you can. Continued rest without activity is usually not good for your back. · To prevent future back pain, do exercises to stretch and strengthen your back and stomach. Learn how to use good posture, safe lifting techniques, and proper body mechanics. When should you call for help? Call your doctor now or seek immediate medical care if:    · You have new or worsening numbness in your legs.     · You have new or worsening weakness in your legs. (This could make it hard to stand up.)     · You lose control of your bladder or bowels.    Watch closely for changes in your health, and be sure to contact your doctor if:    · You have a fever, lose weight, or don't feel well.     · You do not get better as expected. Where can you learn more? Go to http://latonia-gray.info/. Enter O706 in the search box to learn more about \"Back Pain: Care Instructions. \"  Current as of: November 29, 2017  Content Version: 11.7  © 7735-5036 [employer]. Care instructions adapted under license by [employer] (which disclaims liability or warranty for this information). If you have questions about a medical condition or this instruction, always ask your healthcare professional. [place] [address] any warranty or liability for your use of this information. Learning About Relief for Back Pain  What is back tension and strain?     Back strain happens when you overstretch, or pull, a muscle in your back. You may hurt your back in an accident or when you exercise or lift something. Most back pain will get better with rest and time. You can take care of yourself at home to help your back heal.  What can you do first to relieve back pain? When you first feel back pain, try these steps:  · Walk. Take a short walk (10 to 20 minutes) on a level surface (no slopes, hills, or stairs) every 2 to 3 hours. Walk only distances you can manage without pain, especially leg pain. · Relax. Find a comfortable position for rest. Some people are comfortable on the floor or a medium-firm bed with a small pillow under their head and another under their knees. Some people prefer to lie on their side with a pillow between their knees. Don't stay in one position for too long. · Try heat or ice. Try using a heating pad on a low or medium setting, or take a warm shower, for 15 to 20 minutes every 2 to 3 hours. Or you can buy single-use heat wraps that last up to 8 hours. You can also try an ice pack for 10 to 15 minutes every 2 to 3 hours. You can use an ice pack or a bag of frozen vegetables wrapped in a thin towel. There is not strong evidence that either heat or ice will help, but you can try them to see if they help. You may also want to try switching between heat and cold. · Take pain medicine exactly as directed. ¨ If the doctor gave you a prescription medicine for pain, take it as prescribed. ¨ If you are not taking a prescription pain medicine, ask your doctor if you can take an over-the-counter medicine. What else can you do? · Stretch and exercise. Exercises that increase flexibility may relieve your pain and make it easier for your muscles to keep your spine in a good, neutral position. And don't forget to keep walking. · Do self-massage. You can use self-massage to unwind after work or school or to energize yourself in the morning.  You can easily massage your feet, hands, or neck. Self-massage works best if you are in comfortable clothes and are sitting or lying in a comfortable position. Use oil or lotion to massage bare skin. · Reduce stress. Back pain can lead to a vicious [place]: Distress about the pain tenses the muscles in your back, which in turn causes more pain. Learn how to relax your mind and your muscles to lower your stress. Where can you learn more? Go to http://latonia-gray.info/. Enter Z322 in the search box to learn more about \"Learning About Relief for Back Pain. \"  Current as of: November 29, 2017  Content Version: 11.7  © 6586-9110 test company. Care instructions adapted under license by [employer] (which disclaims liability or warranty for this information). If you have questions about a medical condition or this instruction, always ask your healthcare professional. [place] [address] any warranty or liability for your use of this information. Lightheadedness or Faintness: Care Instructions  Your Care Instructions  Lightheadedness is a feeling that you are about to faint or \"pass out. \" You do not feel as if you or your surroundings are moving. It is different from vertigo, which is the feeling that you or things around you are spinning or tilting. Lightheadedness usually goes away or gets better when you lie down. If lightheadedness gets worse, it can lead to a fainting spell. It is common to feel lightheaded from time to time. Lightheadedness usually is not caused by a serious problem. It often is caused by a short-lasting drop in blood pressure and blood flow to your head that occurs when you get up too quickly from a seated or lying position. Follow-up care is a key part of your treatment and safety. Be sure to make and go to all appointments, and call your doctor if you are having problems.  It's also a good idea to know your test results and keep a list of the medicines you take.  How can you care for yourself at home? · Lie down for 1 or 2 minutes when you feel lightheaded. After lying down, sit up slowly and remain sitting for 1 to 2 minutes before slowly standing up. · Avoid movements, positions, or activities that have made you lightheaded in the past.  · Get plenty of rest, especially if you have a cold or flu, which can cause lightheadedness. · Make sure you drink plenty of fluids, especially if you have a fever or have been sweating. · Do not drive or put yourself and others in danger while you feel lightheaded. When should you call for help? Call 911 anytime you think you may need emergency care. For example, call if:    · You have symptoms of a stroke. These may include:  ¨ Sudden numbness, tingling, weakness, or loss of movement in your face, arm, or leg, especially on only one side of your body. ¨ Sudden vision changes. ¨ Sudden trouble speaking. ¨ Sudden confusion or trouble understanding simple statements. ¨ Sudden problems with walking or balance. ¨ A sudden, severe headache that is different from past headaches.     · You have symptoms of a heart attack. These may include:  ¨ Chest pain or pressure, or a strange feeling in the chest.  ¨ Sweating. ¨ Shortness of breath. ¨ Nausea or vomiting. ¨ Pain, pressure, or a strange feeling in the back, neck, jaw, or upper belly or in one or both shoulders or arms. ¨ Lightheadedness or sudden weakness. ¨ A fast or irregular heartbeat. After you call 911, the  may tell you to chew 1 adult-strength or 2 to 4 low-dose aspirin. Wait for an ambulance. Do not try to drive yourself.    Watch closely for changes in your health, and be sure to contact your doctor if:    · Your lightheadedness gets worse or does not get better with home care. Where can you learn more? Go to http://latonia-gray.info/.   Enter R132 in the search box to learn more about \"Lightheadedness or Faintness: Care Instructions. \"  Current as of: November 20, 2017  Content Version: 11.7  © 1452-4735 [employer], [facility]. Care instructions adapted under license by [employer] (which disclaims liability or warranty for this information). If you have questions about a medical condition or this instruction, always ask your healthcare professional. [place] [address] any warranty or liability for your use of this information.

## 2018-08-25 NOTE — ED TRIAGE NOTES
Pt reports pain in her lower back for 3 days. Pt also reports feeling dizzy this morning, states she fainted. Pt remembers fainting she says.  Pt ambulatory to triage

## 2018-08-25 NOTE — ED NOTES
I have reviewed discharge instructions with the patient. The patient verbalized understanding. Patient left ED via Discharge Method: ambulatory to Home with self    Opportunity for questions and clarification provided. Patient given 2 scripts. To continue your aftercare when you leave the hospital, you may receive an automated call from our care team to check in on how you are doing. This is a free service and part of our promise to provide the best care and service to meet your aftercare needs.  If you have questions, or wish to unsubscribe from this service please call [phone]. Thank you for Choosing our [facility] Emergency Department.

## 2018-08-25 NOTE — LETTER
[address] [facility] EMERGENCY DEPT One [address] [id number] [phone] Work/School Note Date: 8/25/2018 To Whom It May concern: 
 
Elissa Varner was seen and treated today in the emergency room by the following provider(s): 
Attending Provider: Orville Ramirez MD. Elissa Varner may return to work on 08/26/18. Sincerely, 
 
 
 
 
[place]

## 2018-08-26 LAB
ATRIAL RATE: 67 BPM
CALCULATED P AXIS, ECG09: 30 DEGREES
CALCULATED R AXIS, ECG10: 76 DEGREES
CALCULATED T AXIS, ECG11: 44 DEGREES
DIAGNOSIS, 93000: NORMAL
P-R INTERVAL, ECG05: 166 MS
Q-T INTERVAL, ECG07: 386 MS
QRS DURATION, ECG06: 72 MS
QTC CALCULATION (BEZET), ECG08: 407 MS
VENTRICULAR RATE, ECG03: 67 BPM

## 2018-09-12 ENCOUNTER — HOSPITAL ENCOUNTER (EMERGENCY)
Age: 28
Discharge: HOME | End: 2018-09-12
Attending: EMERGENCY MEDICINE
Payer: SELF-PAY

## 2018-09-12 VITALS
OXYGEN SATURATION: 99 % | DIASTOLIC BLOOD PRESSURE: 99 MMHG | SYSTOLIC BLOOD PRESSURE: 150 MMHG | HEART RATE: 87 BPM | RESPIRATION RATE: 18 BRPM | TEMPERATURE: 98.6 F

## 2018-09-12 DIAGNOSIS — J02.0 ACUTE STREPTOCOCCAL PHARYNGITIS: Primary | ICD-10-CM

## 2018-09-12 LAB — DEPRECATED S PYO AG THROAT QL EIA: POSITIVE

## 2018-09-12 PROCEDURE — 99282 EMERGENCY DEPT VISIT SF MDM: CPT | Performed by: PHYSICIAN ASSISTANT

## 2018-09-12 PROCEDURE — 87880 STREP A ASSAY W/OPTIC: CPT

## 2018-09-12 RX ORDER — AMOXICILLIN 875 MG/1
875 TABLET, FILM COATED ORAL 2 TIMES DAILY
Qty: 20 TAB | Refills: 0 | Status: SHIPPED | OUTPATIENT
Start: 2018-09-12 | End: 2018-09-22

## 2018-09-12 RX ORDER — AMOXICILLIN 875 MG/1
875 TABLET, FILM COATED ORAL 2 TIMES DAILY
Qty: 20 TAB | Refills: 0 | Status: SHIPPED | OUTPATIENT
Start: 2018-09-12 | End: 2018-09-12

## 2018-09-12 NOTE — LETTER
[address] [facility] EMERGENCY DEPT One [address] [id number] [phone] Work/School Note Date: 9/12/2018 To Whom It May concern: 
 
Glenny Connelly was seen and treated today in the emergency room by the following provider(s): 
Attending Provider: Aric Perry MD 
Physician Assistant: VIK Givens. Glenny Connelly may return to work on 09/14/18. Sincerely, VIK Givens

## 2018-09-12 NOTE — PROGRESS NOTES
I have reviewed discharge instructions with the patient. The patient verbalized understanding. Patient left ED via Discharge Method: ambulatory to Home with self Opportunity for questions and clarification provided. Patient given 1 scripts. To continue your aftercare when you leave the hospital, you may receive an automated call from our care team to check in on how you are doing. This is a free service and part of our promise to provide the best care and service to meet your aftercare needs.  If you have questions, or wish to unsubscribe from this service please call [phone]. Thank you for Choosing our [facility] Emergency Department.

## 2018-09-12 NOTE — DISCHARGE INSTRUCTIONS

## 2018-09-12 NOTE — ED PROVIDER NOTES
HPI Comments: Patient is here with a sore throat for the last 3 days and subjective fever. She was diagnosed with strep throat on August 22 and is having the same symptoms. She was written a prescription for penicillin at that time, finished them and was doing much better until 3 days ago. She does have 2 children but they do not have similar symptoms. She is not having any nausea, vomiting, headache, neck pain, chest pain, shortness of breath, cough, dizziness, weakness, dyspnea on exertion, or other symptoms. She was ambulatory to the room without difficulty and well hydrated. Patient is a 32 y.o. female presenting with sore throat. The history is provided by the patient. Sore Throat This is a new problem. The current episode started more than 2 days ago (3 days now). The problem has not changed since onset. Patient reports a subjective fever - was not measured. Associated symptoms include swollen glands. Pertinent negatives include no diarrhea, no vomiting, no congestion, no drooling, no ear discharge, no ear pain, no headaches, no plugged ear sensation, no shortness of breath, no stridor, no trouble swallowing, no stiff neck and no cough. She has tried nothing for the symptoms. Past Medical History:  
Diagnosis Date  Ectopic pregnancy, tubal, right, unruptured 12/6/2010  Ill-defined condition   
 exzema Past Surgical History:  
Procedure Laterality Date  HX GYN  2014 Righ tube removed.  HX GYN  2009 Left tube removed  HX OTHER SURGICAL    
 HX WISDOM TEETH EXTRACTION    
 uncomplicated Family History:  
Problem Relation Age of Onset  Heart Disease Paternal Grandmother  Cancer Paternal Grandfather  Thyroid Disease Mother  Hypertension Mother  No Known Problems Father  Hypertension Sister  Heart Disease Paternal Aunt  Cancer Paternal Aunt Social History Social History  Marital status: SINGLE   Spouse name: N/A  
  Number of children: 1  Years of education: N/A Occupational History     
  works at [employer] Social History Main Topics  Smoking status: Current Every Day Smoker Packs/day: 0.10 Types: Cigarettes  Smokeless tobacco: Never Used  Alcohol use Yes Comment: occasionally  Drug use: No  
 Sexual activity: Yes  
  Partners: Male Birth control/ protection: None Other Topics Concern  Not on file Social History Narrative ALLERGIES: Reglan [metoclopramide] Review of Systems Constitutional: Negative. HENT: Positive for sore throat. Negative for congestion, drooling, ear discharge, ear pain and trouble swallowing. Eyes: Negative. Respiratory: Negative. Negative for cough, shortness of breath and stridor. Cardiovascular: Negative. Gastrointestinal: Negative. Negative for diarrhea and vomiting. Genitourinary: Negative. Musculoskeletal: Negative. Skin: Negative. Neurological: Negative. Negative for headaches. Psychiatric/Behavioral: Negative. All other systems reviewed and are negative. Vitals:  
 09/12/18 1259 BP: (!) 150/99 Pulse: 87 Resp: 18 Temp: 98.6 °F (37 °C) SpO2: 99% Physical Exam  
Constitutional: She is oriented to person, place, and time. She appears well-developed and well-nourished. HENT:  
Head: Normocephalic and atraumatic. Right Ear: External ear normal.  
Left Ear: External ear normal.  
Nose: Nose normal.  
Mouth/Throat: Uvula is midline and mucous membranes are normal. Oropharyngeal exudate, posterior oropharyngeal edema and posterior oropharyngeal erythema present. No tonsillar abscesses. Eyes: Conjunctivae and EOM are normal. Pupils are equal, round, and reactive to light. Neck: Normal range of motion. Neck supple. Cardiovascular: Normal rate, regular rhythm, normal heart sounds and intact distal pulses.    
Pulmonary/Chest: Effort normal and breath sounds normal.  
 Abdominal: Soft. Bowel sounds are normal.  
Musculoskeletal: Normal range of motion. Neurological: She is alert and oriented to person, place, and time. She has normal reflexes. Skin: Skin is warm and dry. Psychiatric: She has a normal mood and affect. Her behavior is normal. Judgment and thought content normal.  
Nursing note and vitals reviewed. MDM Number of Diagnoses or Management Options Acute streptococcal pharyngitis:  
  
Amount and/or Complexity of Data Reviewed Clinical lab tests: ordered and reviewed Risk of Complications, Morbidity, and/or Mortality Presenting problems: moderate Diagnostic procedures: moderate Management options: moderate Patient Progress Patient progress: stable ED Course Procedures The patient was observed in the ED. Results Reviewed: 
 
 
Recent Results (from the past 24 hour(s)) STREP AG SCREEN, GROUP A Collection Time: 09/12/18  1:00 PM  
Result Value Ref Range Group A Strep Ag ID POSITIVE (A) NEG Patient was referred to ENT for further evaluation of her recurrent strep. I did write a prescription for amoxicillin which has broader spectrum. She should drink plenty of fluids, rest and return to the ED if worsening. She is stable for discharge. I discussed the results of all labs, procedures, radiographs, and treatments with the patient and available family. Treatment plan is agreed upon and the patient is ready for discharge. All voiced understanding of the discharge plan and medication instructions or changes as appropriate. Questions about treatment in the ED were answered. All were encouraged to return should symptoms worsen or new problems develop.

## 2018-11-06 ENCOUNTER — HOSPITAL ENCOUNTER (EMERGENCY)
Age: 28
Discharge: HOME | End: 2018-11-06
Payer: SELF-PAY

## 2018-11-06 ENCOUNTER — APPOINTMENT (OUTPATIENT)
Dept: CT IMAGING | Age: 28
End: 2018-11-06
Payer: SELF-PAY

## 2018-11-06 VITALS
BODY MASS INDEX: 23.82 KG/M2 | RESPIRATION RATE: 16 BRPM | HEIGHT: 65 IN | WEIGHT: 143 LBS | SYSTOLIC BLOOD PRESSURE: 140 MMHG | DIASTOLIC BLOOD PRESSURE: 81 MMHG | OXYGEN SATURATION: 99 % | HEART RATE: 75 BPM | TEMPERATURE: 98.2 F

## 2018-11-06 DIAGNOSIS — R10.84 ABDOMINAL PAIN, GENERALIZED: Primary | ICD-10-CM

## 2018-11-06 LAB
ALBUMIN SERPL-MCNC: 3.9 G/DL (ref 3.5–5)
ALBUMIN/GLOB SERPL: 1.2 {RATIO} (ref 1.2–3.5)
ALP SERPL-CCNC: 65 U/L (ref 50–136)
ALT SERPL-CCNC: 27 U/L (ref 12–65)
ANION GAP SERPL CALC-SCNC: 9 MMOL/L (ref 7–16)
AST SERPL-CCNC: 40 U/L (ref 15–37)
BASOPHILS # BLD: 0 K/UL (ref 0–0.2)
BASOPHILS NFR BLD: 0 % (ref 0–2)
BILIRUB SERPL-MCNC: 1 MG/DL (ref 0.2–1.1)
BUN SERPL-MCNC: 13 MG/DL (ref 6–23)
CALCIUM SERPL-MCNC: 8.4 MG/DL (ref 8.3–10.4)
CHLORIDE SERPL-SCNC: 105 MMOL/L (ref 98–107)
CO2 SERPL-SCNC: 22 MMOL/L (ref 21–32)
CREAT SERPL-MCNC: 0.89 MG/DL (ref 0.6–1)
DIFFERENTIAL METHOD BLD: ABNORMAL
EOSINOPHIL # BLD: 0.1 K/UL (ref 0–0.8)
EOSINOPHIL NFR BLD: 1 % (ref 0.5–7.8)
ERYTHROCYTE [DISTWIDTH] IN BLOOD BY AUTOMATED COUNT: 14.1 %
GLOBULIN SER CALC-MCNC: 3.2 G/DL (ref 2.3–3.5)
GLUCOSE SERPL-MCNC: 93 MG/DL (ref 65–100)
HCG UR QL: NEGATIVE
HCT VFR BLD AUTO: 35.5 % (ref 35.8–46.3)
HGB BLD-MCNC: 11.6 G/DL (ref 11.7–15.4)
IMM GRANULOCYTES # BLD: 0 K/UL (ref 0–0.5)
IMM GRANULOCYTES NFR BLD AUTO: 0 % (ref 0–5)
LYMPHOCYTES # BLD: 1.5 K/UL (ref 0.5–4.6)
LYMPHOCYTES NFR BLD: 22 % (ref 13–44)
MCH RBC QN AUTO: 31.8 PG (ref 26.1–32.9)
MCHC RBC AUTO-ENTMCNC: 32.7 G/DL (ref 31.4–35)
MCV RBC AUTO: 97.3 FL (ref 79.6–97.8)
MONOCYTES # BLD: 0.6 K/UL (ref 0.1–1.3)
MONOCYTES NFR BLD: 9 % (ref 4–12)
NEUTS SEG # BLD: 4.6 K/UL (ref 1.7–8.2)
NEUTS SEG NFR BLD: 68 % (ref 43–78)
NRBC # BLD: 0 K/UL (ref 0–0.2)
PLATELET # BLD AUTO: 288 K/UL (ref 150–450)
PMV BLD AUTO: 10 FL (ref 9.4–12.3)
POTASSIUM SERPL-SCNC: 4.5 MMOL/L (ref 3.5–5.1)
PROT SERPL-MCNC: 7.1 G/DL (ref 6.3–8.2)
RBC # BLD AUTO: 3.65 M/UL (ref 4.05–5.2)
SODIUM SERPL-SCNC: 136 MMOL/L (ref 136–145)
WBC # BLD AUTO: 6.8 K/UL (ref 4.3–11.1)

## 2018-11-06 PROCEDURE — 74011250636 HC RX REV CODE- 250/636

## 2018-11-06 PROCEDURE — 81003 URINALYSIS AUTO W/O SCOPE: CPT

## 2018-11-06 PROCEDURE — 85025 COMPLETE CBC W/AUTO DIFF WBC: CPT

## 2018-11-06 PROCEDURE — 74176 CT ABD & PELVIS W/O CONTRAST: CPT

## 2018-11-06 PROCEDURE — 81025 URINE PREGNANCY TEST: CPT

## 2018-11-06 PROCEDURE — 96374 THER/PROPH/DIAG INJ IV PUSH: CPT

## 2018-11-06 PROCEDURE — 74011250637 HC RX REV CODE- 250/637

## 2018-11-06 PROCEDURE — 80053 COMPREHEN METABOLIC PANEL: CPT

## 2018-11-06 PROCEDURE — 99285 EMERGENCY DEPT VISIT HI MDM: CPT

## 2018-11-06 RX ORDER — KETOROLAC TROMETHAMINE 30 MG/ML
30 INJECTION, SOLUTION INTRAMUSCULAR; INTRAVENOUS
Status: COMPLETED | OUTPATIENT
Start: 2018-11-06 | End: 2018-11-06

## 2018-11-06 RX ORDER — SODIUM CHLORIDE 0.9 % (FLUSH) 0.9 %
5-10 SYRINGE (ML) INJECTION EVERY 8 HOURS
Status: DISCONTINUED | OUTPATIENT
Start: 2018-11-06 | End: 2018-11-06 | Stop reason: HOSPADM

## 2018-11-06 RX ORDER — DICYCLOMINE HYDROCHLORIDE 20 MG/1
20 TABLET ORAL EVERY 6 HOURS
Qty: 10 TAB | Refills: 0 | Status: SHIPPED | OUTPATIENT
Start: 2018-11-06 | End: 2020-03-31

## 2018-11-06 RX ORDER — HYOSCYAMINE SULFATE 0.12 MG/1
0.12 TABLET SUBLINGUAL
Status: COMPLETED | OUTPATIENT
Start: 2018-11-06 | End: 2018-11-06

## 2018-11-06 RX ORDER — DICYCLOMINE HYDROCHLORIDE 20 MG/1
20 TABLET ORAL EVERY 6 HOURS
Qty: 10 TAB | Refills: 0 | Status: SHIPPED | OUTPATIENT
Start: 2018-11-06 | End: 2018-11-06

## 2018-11-06 RX ORDER — LANSOPRAZOLE 30 MG/1
30 CAPSULE, DELAYED RELEASE ORAL DAILY
Qty: 20 CAP | Refills: 0 | Status: SHIPPED | OUTPATIENT
Start: 2018-11-06 | End: 2018-11-26

## 2018-11-06 RX ORDER — LANSOPRAZOLE 30 MG/1
30 CAPSULE, DELAYED RELEASE ORAL DAILY
Qty: 20 CAP | Refills: 0 | Status: SHIPPED | OUTPATIENT
Start: 2018-11-06 | End: 2018-11-06

## 2018-11-06 RX ORDER — SODIUM CHLORIDE 0.9 % (FLUSH) 0.9 %
5-10 SYRINGE (ML) INJECTION AS NEEDED
Status: DISCONTINUED | OUTPATIENT
Start: 2018-11-06 | End: 2018-11-06 | Stop reason: HOSPADM

## 2018-11-06 RX ADMIN — HYOSCYAMINE SULFATE 0.12 MG: 0.12 TABLET ORAL at 06:44

## 2018-11-06 RX ADMIN — KETOROLAC TROMETHAMINE 30 MG: 30 INJECTION, SOLUTION INTRAMUSCULAR at 06:44

## 2018-11-06 NOTE — LETTER
NOTIFICATION RETURN TO WORK / SCHOOL 
 
11/6/2018 7:03 AM 
 
Ms. Tal Layton 
[address] [facility] [id number] To Whom It May Concern: 
 
Tal Layton is currently under the care of [facility] EMERGENCY DEPT. She will return to work/school on: 11/7/18 Sincerely,

## 2018-11-06 NOTE — ED TRIAGE NOTES
Sudden onset of lower abdominal pain since 2100 tonight. Denies bleeding or discharge.   LMP first week in Oct.

## 2018-11-06 NOTE — DISCHARGE INSTRUCTIONS

## 2018-11-06 NOTE — ED NOTES
I have reviewed discharge instructions with the patient. The patient verbalized understanding. Patient left ED via Discharge Method: ambulatory to Home with self. Opportunity for questions and clarification provided. Patient given 2 scripts. To continue your aftercare when you leave the hospital, you may receive an automated call from our care team to check in on how you are doing. This is a free service and part of our promise to provide the best care and service to meet your aftercare needs.  If you have questions, or wish to unsubscribe from this service please call [phone]. Thank you for Choosing our [facility] Emergency Department.

## 2018-11-07 NOTE — ED PROVIDER NOTES
70-year-old female with lower abdominal pain started at 9 PM.  No fevers or chills no diarrhea no constipation no vaginal bleeding or discharge. The history is provided by the patient. Abdominal Pain This is a new problem. The current episode started 3 to 5 hours ago. The problem occurs constantly. The problem has not changed since onset. The pain is located in the suprapubic region. The quality of the pain is cramping. The pain is severe. Past Medical History:  
Diagnosis Date  Ectopic pregnancy, tubal, right, unruptured 12/6/2010  Ill-defined condition   
 exzema Past Surgical History:  
Procedure Laterality Date  HX GYN  2014 Righ tube removed.  HX GYN  2009 Left tube removed  HX OTHER SURGICAL    
 HX WISDOM TEETH EXTRACTION    
 uncomplicated Family History:  
Problem Relation Age of Onset  Heart Disease Paternal Grandmother  Cancer Paternal Grandfather  Thyroid Disease Mother  Hypertension Mother  No Known Problems Father  Hypertension Sister  Heart Disease Paternal Aunt  Cancer Paternal Aunt Social History Socioeconomic History  Marital status: SINGLE Spouse name: Not on file  Number of children: 1  Years of education: Not on file  Highest education level: Not on file Social Needs  Financial resource strain: Not on file  Food insecurity - worry: Not on file  Food insecurity - inability: Not on file  Transportation needs - medical: Not on file  Transportation needs - non-medical: Not on file Occupational History Comment: works at [employer] Tobacco Use  Smoking status: Current Every Day Smoker Packs/day: 0.10 Types: Cigarettes  Smokeless tobacco: Never Used Substance and Sexual Activity  Alcohol use: Yes Comment: occasionally  Drug use: No  
 Sexual activity: Yes  
  Partners: Male Birth control/protection: None Other Topics Concern  Not on file Social History Narrative  Not on file ALLERGIES: Reglan [metoclopramide] Review of Systems Constitutional: Negative. Negative for activity change. HENT: Negative. Eyes: Negative. Respiratory: Negative. Cardiovascular: Negative. Gastrointestinal: Positive for abdominal pain. Genitourinary: Negative. Musculoskeletal: Negative. Skin: Negative. Neurological: Negative. Psychiatric/Behavioral: Negative. All other systems reviewed and are negative. Vitals:  
 11/06/18 0505 11/06/18 6841 11/06/18 3878 11/06/18 6146 BP: 140/81 142/89 154/85 140/81 Pulse: 67 70 64 75 Resp:      
Temp:      
SpO2: 99% 99% 100% 99% Weight:      
Height:      
      
 
Physical Exam  
Constitutional: She is oriented to person, place, and time. She appears well-developed and well-nourished. No distress. HENT:  
Head: Normocephalic and atraumatic. Right Ear: External ear normal.  
Left Ear: External ear normal.  
Nose: Nose normal.  
Eyes: Conjunctivae and EOM are normal. Pupils are equal, round, and reactive to light. Right eye exhibits no discharge. Left eye exhibits no discharge. No scleral icterus. Neck: Normal range of motion. Cardiovascular: Regular rhythm. Pulmonary/Chest: Effort normal and breath sounds normal. No stridor. No respiratory distress. She has no wheezes. She has no rales. Abdominal: Soft. Bowel sounds are normal. She exhibits no distension. There is no hepatosplenomegaly. There is generalized tenderness and tenderness in the suprapubic area. There is no rigidity, no rebound, no guarding, no tenderness at McBurney's point and negative Avila's sign. Musculoskeletal: Normal range of motion. Neurological: She is alert and oriented to person, place, and time. She exhibits normal muscle tone. Coordination normal.  
Skin: Skin is warm and dry. No rash noted. Psychiatric: She has a normal mood and affect.  Her behavior is normal.  
  
 
MDM 
 Number of Diagnoses or Management Options Abdominal pain, generalized:  
Diagnosis management comments: Assessment abdominal pain etiology unclear however no worrisome findings on physical exam laboratory data. Amount and/or Complexity of Data Reviewed Clinical lab tests: ordered and reviewed Tests in the radiology section of CPT®: ordered and reviewed Tests in the medicine section of CPT®: reviewed and ordered Procedures

## 2020-03-31 ENCOUNTER — APPOINTMENT (OUTPATIENT)
Dept: CT IMAGING | Age: 30
End: 2020-03-31
Attending: EMERGENCY MEDICINE
Payer: SELF-PAY

## 2020-03-31 ENCOUNTER — HOSPITAL ENCOUNTER (EMERGENCY)
Age: 30
Discharge: HOME | End: 2020-03-31
Attending: EMERGENCY MEDICINE
Payer: SELF-PAY

## 2020-03-31 VITALS
WEIGHT: 150 LBS | TEMPERATURE: 98.6 F | OXYGEN SATURATION: 100 % | HEART RATE: 80 BPM | HEIGHT: 65 IN | SYSTOLIC BLOOD PRESSURE: 132 MMHG | DIASTOLIC BLOOD PRESSURE: 80 MMHG | BODY MASS INDEX: 24.99 KG/M2 | RESPIRATION RATE: 16 BRPM

## 2020-03-31 DIAGNOSIS — R10.32 ABDOMINAL PAIN, LLQ (LEFT LOWER QUADRANT): Primary | ICD-10-CM

## 2020-03-31 LAB
ALBUMIN SERPL-MCNC: 3.6 G/DL (ref 3.5–5)
ALBUMIN/GLOB SERPL: 1 {RATIO} (ref 1.2–3.5)
ALP SERPL-CCNC: 65 U/L (ref 50–130)
ALT SERPL-CCNC: 18 U/L (ref 12–65)
ANION GAP SERPL CALC-SCNC: 5 MMOL/L (ref 7–16)
AST SERPL-CCNC: 17 U/L (ref 15–37)
BASOPHILS # BLD: 0 K/UL (ref 0–0.2)
BASOPHILS NFR BLD: 1 % (ref 0–2)
BILIRUB SERPL-MCNC: 0.4 MG/DL (ref 0.2–1.1)
BUN SERPL-MCNC: 18 MG/DL (ref 6–23)
CALCIUM SERPL-MCNC: 9.1 MG/DL (ref 8.3–10.4)
CHLORIDE SERPL-SCNC: 108 MMOL/L (ref 98–107)
CO2 SERPL-SCNC: 25 MMOL/L (ref 21–32)
CREAT SERPL-MCNC: 1.12 MG/DL (ref 0.6–1)
DIFFERENTIAL METHOD BLD: ABNORMAL
EOSINOPHIL # BLD: 0.1 K/UL (ref 0–0.8)
EOSINOPHIL NFR BLD: 1 % (ref 0.5–7.8)
ERYTHROCYTE [DISTWIDTH] IN BLOOD BY AUTOMATED COUNT: 13.5 % (ref 11.9–14.6)
GLOBULIN SER CALC-MCNC: 3.7 G/DL (ref 2.3–3.5)
GLUCOSE SERPL-MCNC: 105 MG/DL (ref 65–100)
HCG UR QL: NEGATIVE
HCT VFR BLD AUTO: 36.8 % (ref 35.8–46.3)
HGB BLD-MCNC: 12 G/DL (ref 11.7–15.4)
IMM GRANULOCYTES # BLD AUTO: 0 K/UL (ref 0–0.5)
IMM GRANULOCYTES NFR BLD AUTO: 0 % (ref 0–5)
LIPASE SERPL-CCNC: 104 U/L (ref 73–393)
LYMPHOCYTES # BLD: 2.5 K/UL (ref 0.5–4.6)
LYMPHOCYTES NFR BLD: 39 % (ref 13–44)
MCH RBC QN AUTO: 30.8 PG (ref 26.1–32.9)
MCHC RBC AUTO-ENTMCNC: 32.6 G/DL (ref 31.4–35)
MCV RBC AUTO: 94.4 FL (ref 79.6–97.8)
MONOCYTES # BLD: 0.5 K/UL (ref 0.1–1.3)
MONOCYTES NFR BLD: 7 % (ref 4–12)
NEUTS SEG # BLD: 3.4 K/UL (ref 1.7–8.2)
NEUTS SEG NFR BLD: 52 % (ref 43–78)
NRBC # BLD: 0 K/UL (ref 0–0.2)
PLATELET # BLD AUTO: 285 K/UL (ref 150–450)
PMV BLD AUTO: 9.3 FL (ref 9.4–12.3)
POTASSIUM SERPL-SCNC: 3.8 MMOL/L (ref 3.5–5.1)
PROT SERPL-MCNC: 7.3 G/DL (ref 6.3–8.2)
RBC # BLD AUTO: 3.9 M/UL (ref 4.05–5.2)
SODIUM SERPL-SCNC: 138 MMOL/L (ref 136–145)
T4 FREE SERPL-MCNC: 0.9 NG/DL (ref 0.9–1.8)
TSH SERPL DL<=0.005 MIU/L-ACNC: 2.11 UIU/ML
WBC # BLD AUTO: 6.5 K/UL (ref 4.3–11.1)

## 2020-03-31 PROCEDURE — 85025 COMPLETE CBC W/AUTO DIFF WBC: CPT

## 2020-03-31 PROCEDURE — 84443 ASSAY THYROID STIM HORMONE: CPT

## 2020-03-31 PROCEDURE — 80053 COMPREHEN METABOLIC PANEL: CPT

## 2020-03-31 PROCEDURE — 74011000258 HC RX REV CODE- 258: Performed by: EMERGENCY MEDICINE

## 2020-03-31 PROCEDURE — 84439 ASSAY OF FREE THYROXINE: CPT

## 2020-03-31 PROCEDURE — 74177 CT ABD & PELVIS W/CONTRAST: CPT

## 2020-03-31 PROCEDURE — 81025 URINE PREGNANCY TEST: CPT

## 2020-03-31 PROCEDURE — 83690 ASSAY OF LIPASE: CPT

## 2020-03-31 PROCEDURE — 81003 URINALYSIS AUTO W/O SCOPE: CPT

## 2020-03-31 PROCEDURE — 74011636320 HC RX REV CODE- 636/320: Performed by: EMERGENCY MEDICINE

## 2020-03-31 PROCEDURE — 99284 EMERGENCY DEPT VISIT MOD MDM: CPT

## 2020-03-31 RX ORDER — POLYETHYLENE GLYCOL 3350 17 G/17G
17 POWDER, FOR SOLUTION ORAL DAILY
Qty: 238 G | Refills: 0 | Status: SHIPPED | OUTPATIENT
Start: 2020-03-31 | End: 2020-04-14

## 2020-03-31 RX ORDER — TRAMADOL HYDROCHLORIDE 50 MG/1
50 TABLET ORAL
Qty: 12 TAB | Refills: 0 | Status: SHIPPED | OUTPATIENT
Start: 2020-03-31 | End: 2020-04-03

## 2020-03-31 RX ORDER — SODIUM CHLORIDE 0.9 % (FLUSH) 0.9 %
10 SYRINGE (ML) INJECTION
Status: COMPLETED | OUTPATIENT
Start: 2020-03-31 | End: 2020-03-31

## 2020-03-31 RX ORDER — AMOXICILLIN AND CLAVULANATE POTASSIUM 875; 125 MG/1; MG/1
1 TABLET, FILM COATED ORAL 2 TIMES DAILY
Qty: 14 TAB | Refills: 0 | Status: SHIPPED | OUTPATIENT
Start: 2020-03-31 | End: 2020-04-07

## 2020-03-31 RX ORDER — DOCUSATE SODIUM 100 MG/1
100 CAPSULE, LIQUID FILLED ORAL 2 TIMES DAILY
Qty: 60 CAP | Refills: 0 | Status: SHIPPED | OUTPATIENT
Start: 2020-03-31 | End: 2020-04-30

## 2020-03-31 RX ADMIN — Medication 10 ML: at 21:29

## 2020-03-31 RX ADMIN — SODIUM CHLORIDE 100 ML: 900 INJECTION, SOLUTION INTRAVENOUS at 21:29

## 2020-03-31 RX ADMIN — IOPAMIDOL 100 ML: 755 INJECTION, SOLUTION INTRAVENOUS at 21:29

## 2020-03-31 NOTE — LETTER
[address] [address] EMERGENCY DEPT 
[address] [facility] [id number] [phone] Work/School Note Date: 3/31/2020 To Whom It May concern: 
 
Christiana Ang was seen and treated today in the emergency room by the following provider(s): 
Attending Provider: Wing Bernard MD. Christiana Fry {Return to school/sport/work: 4/1/2020 Sincerely, Mera Powell MD

## 2020-04-01 NOTE — ED PROVIDER NOTES
HPI:  70-year-old female is here with constipation and abdominal pain. Stated 6 months ago out of the blue she started having constipation. Normally she would have a bowel movement every 2 days. She would go between 6 to 10 days without a bowel movement. She has taken stool softener in the past with okay's results. Denies any fever, weight loss. She has had weight gain. She has noticed pencil size stools without any obvious dark stool or blood. Positive family history of colon cancer in her mother who is 46 and was just recently diagnosed. Last bowel movement has been 7 days. Not passing flatus. Stated feeling nauseous without vomiting. No fever    ROS  Constitutional: No fever, no chills  Skin: no rash  Eye: No vision changes  ENMT: No sore throat  Respiratory: No shortness of breath, no cough  Cardiovascular: No chest pain, no palpitations  Gastrointestinal: No vomiting, no nausea, no diarrhea, + abdominal pain  : No dysuria  MSK: No back pain, no muscle pain, no joint pain  Neuro: No headache, no change in mental status, no numbness, no tingling, no weakness  Psych:   Endocrine:   All other review of systems positive per history of present illness and the above otherwise negative or noncontributory. Visit Vitals  /84 (BP 1 Location: Right arm, BP Patient Position: At rest)   Pulse 84   Temp 98.6 °F (37 °C)   Resp 16   Ht 5' 5\" (1.651 m)   Wt 68 kg (150 lb)   SpO2 100%   BMI 24.96 kg/m²     Past Medical History:   Diagnosis Date    Ectopic pregnancy, tubal, right, unruptured 12/6/2010    Ill-defined condition     exzema     Past Surgical History:   Procedure Laterality Date    HX GYN  2014    Righ tube removed.      HX GYN  2009    Left tube removed    HX OTHER SURGICAL      HX WISDOM TEETH EXTRACTION      uncomplicated     None         Adult Exam   General: alert, no acute distress  Head: normocephalic, atraumatic  ENT: moist mucous membranes  Neck: supple, non-tender; full range of motion  Cardiovascular: regular rate and rhythm, normal peripheral perfusion, no edema  Respiratory:  normal respirations; no wheezing, rales or rhonchi  Gastrointestinal: soft, mild distention. Tenderness to palpation throughout the lower abdomen. No rebound or guarding, no peritoneal signs. No CVA tenderness to percussion  Back: non-tender, full range of motion  Musculoskeletal: normal range of motion, normal strength, no gross deformities  Neurological: alert and oriented x 4, no gross focal deficits; normal speech  Psychiatric: cooperative; appropriate mood and affect    MDM:  Pencil size stool with constipation for 6 months. No prior abdominal surgery. Will obtain labs including thyroid level. Will also obtain a CT abdomen and pelvis with IV contrast for further assessment of chronic constipation and acute abdominal pain in an otherwise no prior history and healthy female. No prior history of colitis or diverticulitis. 10:06 PM  Labs unremarkable. CT without significant amount of stool. This questionable under distention versus colitis. She has pain that she complained of the left lower quadrant. There are signs of diverticulosis. Will treat with Augmentin. Will give MiraLAX and Colace as needed for constipation. Ultram as needed for pain. Recommend follow-up with GI for further evaluation. Do not suspect any acute intra-abdominal surgical pathology. Stable for discharge. Patient has no further questions or concerns.     Recent Results (from the past 12 hour(s))   CBC WITH AUTOMATED DIFF    Collection Time: 03/31/20  8:58 PM   Result Value Ref Range    WBC 6.5 4.3 - 11.1 K/uL    RBC 3.90 (L) 4.05 - 5.2 M/uL    HGB 12.0 11.7 - 15.4 g/dL    HCT 36.8 35.8 - 46.3 %    MCV 94.4 79.6 - 97.8 FL    MCH 30.8 26.1 - 32.9 PG    MCHC 32.6 31.4 - 35.0 g/dL    RDW 13.5 11.9 - 14.6 %    PLATELET 753 319 - 369 K/uL    MPV 9.3 (L) 9.4 - 12.3 FL    ABSOLUTE NRBC 0.00 0.0 - 0.2 K/uL    DF AUTOMATED NEUTROPHILS 52 43 - 78 %    LYMPHOCYTES 39 13 - 44 %    MONOCYTES 7 4.0 - 12.0 %    EOSINOPHILS 1 0.5 - 7.8 %    BASOPHILS 1 0.0 - 2.0 %    IMMATURE GRANULOCYTES 0 0.0 - 5.0 %    ABS. NEUTROPHILS 3.4 1.7 - 8.2 K/UL    ABS. LYMPHOCYTES 2.5 0.5 - 4.6 K/UL    ABS. MONOCYTES 0.5 0.1 - 1.3 K/UL    ABS. EOSINOPHILS 0.1 0.0 - 0.8 K/UL    ABS. BASOPHILS 0.0 0.0 - 0.2 K/UL    ABS. IMM. GRANS. 0.0 0.0 - 0.5 K/UL   HCG URINE, QL. - POC    Collection Time: 03/31/20  9:10 PM   Result Value Ref Range    Pregnancy test,urine (POC) NEGATIVE  NEG     LIPASE    Collection Time: 03/31/20  9:20 PM   Result Value Ref Range    Lipase 104 73 - 668 U/L   METABOLIC PANEL, COMPREHENSIVE    Collection Time: 03/31/20  9:20 PM   Result Value Ref Range    Sodium 138 136 - 145 mmol/L    Potassium 3.8 3.5 - 5.1 mmol/L    Chloride 108 (H) 98 - 107 mmol/L    CO2 25 21 - 32 mmol/L    Anion gap 5 (L) 7 - 16 mmol/L    Glucose 105 (H) 65 - 100 mg/dL    BUN 18 6 - 23 MG/DL    Creatinine 1.12 (H) 0.6 - 1.0 MG/DL    GFR est AA >60 >60 ml/min/1.73m2    GFR est non-AA >60 >60 ml/min/1.73m2    Calcium 9.1 8.3 - 10.4 MG/DL    Bilirubin, total 0.4 0.2 - 1.1 MG/DL    ALT (SGPT) 18 12 - 65 U/L    AST (SGOT) 17 15 - 37 U/L    Alk. phosphatase 65 50 - 130 U/L    Protein, total 7.3 6.3 - 8.2 g/dL    Albumin 3.6 3.5 - 5.0 g/dL    Globulin 3.7 (H) 2.3 - 3.5 g/dL    A-G Ratio 1.0 (L) 1.2 - 3.5     TSH 3RD GENERATION    Collection Time: 03/31/20  9:20 PM   Result Value Ref Range    TSH 2.110 uIU/mL       Ct Abd Pelv W Cont    Result Date: 3/31/2020  EXAM: CT abdomen and pelvis with IV contrast. INDICATION: Abdominal pain. COMPARISON: Prior CT abdomen and pelvis on November 6, 2018. TECHNIQUE: Axial CT images of the abdomen and pelvis were obtained after the intravenous injection of 100 mL Isovue 370 CT contrast. Radiation dose reduction techniques were used for this study.   Our CT scanners use one or all of the following:  Automated exposure control, adjustment of the mA or kV according to patient size, iterative reconstruction. FINDINGS: - Liver: Within normal limits. - Gallbladder and bile ducts: Again noted are small gallstones in the gallbladder, with no evidence of acute cholecystitis or biliary tract dilatation. - Spleen: Within normal limits. - Urinary tract: Within normal limits. - Adrenals: Within normal limits. - Pancreas: Within normal limits. - Gastrointestinal tract: There is mild wall prominence of the left colon, extending from the mid transverse to the mid sigmoid colon, which could relate to colitis or artifact as this portion of the colon is nondistended. There are also a few scattered colonic diverticuli, without evidence of acute diverticulitis. The small bowel and appendix are within normal limits. - Retroperitoneum: Within normal limits. - Peritoneal cavity and abdominal wall: Within normal limits. - Pelvis: Within normal limits. - Spine/bones: No acute process. - Other comments: None. IMPRESSION: 1. Mild wall prominence of the left colon may relate to colitis, or artifact from underdistention. 2. Mild colonic diverticulosis, without evidence of acute diverticulitis. 3. Gallstones. Dragon voice recognition software was used to create this note. Although the note has been reviewed and corrected where necessary, additional errors may have been overlooked and remain in the text.

## 2020-04-01 NOTE — ED NOTES
I have reviewed discharge instructions with the patient. The patient verbalized understanding. Patient left ED via Discharge Method: ambulatory to Home with  self,). Opportunity for questions and clarification provided. Patient given 4 scripts. To continue your aftercare when you leave the hospital, you may receive an automated call from our care team to check in on how you are doing. This is a free service and part of our promise to provide the best care and service to meet your aftercare needs.  If you have questions, or wish to unsubscribe from this service please call [phone]. Thank you for Choosing our [facility] Emergency Department.

## 2020-04-01 NOTE — ED TRIAGE NOTES
Pt states abdominal pain that has progressively gotten worse since yesterday. Pt states having no bowel movement in approx 7 days. Pt states pain has been so severe that it has been difficult ambulating. Pt states the pain is cramping in nature that extend across all lower quadrants. Pt states pain radiates to flank areas. Pt denies hx of kidney stones or bladder infections. Pt denies fevers. Pt states nausea but no vomiting. Pt states LMP was 2 weeks ago. Pt denies taking any OTC medications PTA.

## 2020-04-01 NOTE — DISCHARGE INSTRUCTIONS
Take MiraLAX and Colace for constipation. Complete course of Augmentin for treatment of possible colitis. Follow-up with gastrointestinal doctor. Take probiotic. Drink plenty of fluid. Take Ultram as needed for pain.

## 2020-08-18 ENCOUNTER — HOSPITAL ENCOUNTER (EMERGENCY)
Age: 30
Discharge: HOME | End: 2020-08-18
Attending: EMERGENCY MEDICINE
Payer: OTHER GOVERNMENT

## 2020-08-18 VITALS
HEART RATE: 81 BPM | OXYGEN SATURATION: 99 % | BODY MASS INDEX: 24.11 KG/M2 | WEIGHT: 150 LBS | HEIGHT: 66 IN | TEMPERATURE: 99.4 F | RESPIRATION RATE: 16 BRPM | SYSTOLIC BLOOD PRESSURE: 147 MMHG | DIASTOLIC BLOOD PRESSURE: 98 MMHG

## 2020-08-18 DIAGNOSIS — K52.9 GASTROENTERITIS, ACUTE: Primary | ICD-10-CM

## 2020-08-18 LAB — HCG UR QL: NEGATIVE

## 2020-08-18 PROCEDURE — 81003 URINALYSIS AUTO W/O SCOPE: CPT

## 2020-08-18 PROCEDURE — 81025 URINE PREGNANCY TEST: CPT

## 2020-08-18 PROCEDURE — 99284 EMERGENCY DEPT VISIT MOD MDM: CPT

## 2020-08-18 PROCEDURE — 87635 SARS-COV-2 COVID-19 AMP PRB: CPT

## 2020-08-18 RX ORDER — PROMETHAZINE HYDROCHLORIDE 25 MG/1
25 TABLET ORAL
Qty: 15 TAB | Refills: 2 | Status: SHIPPED | OUTPATIENT
Start: 2020-08-18

## 2020-08-18 RX ORDER — ONDANSETRON 4 MG/1
4 TABLET, ORALLY DISINTEGRATING ORAL
Qty: 8 TAB | Refills: 2 | Status: SHIPPED | OUTPATIENT
Start: 2020-08-18

## 2020-08-18 NOTE — ED TRIAGE NOTES
Pt to triage with mask in place. Pt reports n/v/d and headache starting yesterday. Pt states her boss told her she needed to be tested for COVID before she came back to work.

## 2020-08-18 NOTE — ED PROVIDER NOTES
Patient states she was at work this evening when she started feeling sick. She started having vomiting and diarrhea. She has had some chills with no documented fever. She denies any known sick contacts. Her boss made her go home and wanted her to get tested for COVID-19 before she could come back to work. She denies any cough or shortness of breath. She has not taken any medicine for her symptoms, denies any aggravating or alleviating factors. Past Medical History:   Diagnosis Date    Ectopic pregnancy, tubal, right, unruptured 12/6/2010    Ill-defined condition     exzema       Past Surgical History:   Procedure Laterality Date    HX GYN  2014    Righ tube removed.      HX GYN  2009    Left tube removed    HX OTHER SURGICAL      HX WISDOM TEETH EXTRACTION      uncomplicated         Family History:   Problem Relation Age of Onset    Heart Disease Paternal Grandmother     Cancer Paternal Grandfather     Thyroid Disease Mother     Hypertension Mother     No Known Problems Father     Hypertension Sister     Heart Disease Paternal Aunt     Cancer Paternal Aunt        Social History     Socioeconomic History    Marital status:      Spouse name: Not on file    Number of children: 1    Years of education: Not on file    Highest education level: Not on file   Occupational History     Comment: works at [employer] strain: Not on file    Food insecurity     Worry: Not on file     Inability: Not on file   [employer] needs     Medical: Not on file     Non-medical: Not on file   Tobacco Use    Smoking status: Current Every Day Smoker     Packs/day: 0.10     Types: Cigarettes    Smokeless tobacco: Never Used   Substance and Sexual Activity    Alcohol use: Yes     Comment: occasionally    Drug use: No    Sexual activity: Yes     Partners: Male     Birth control/protection: None   Lifestyle    Physical activity     Days per week: Not on file Minutes per session: Not on file    Stress: Not on file   Relationships    Social connections     Talks on phone: Not on file     Gets together: Not on file     Attends Mu-ism service: Not on file     Active member of club or organization: Not on file     Attends meetings of clubs or organizations: Not on file     Relationship status: Not on file    Intimate partner violence     Fear of current or ex partner: Not on file     Emotionally abused: Not on file     Physically abused: Not on file     Forced sexual activity: Not on file   Other Topics Concern    Not on file   Social History Narrative    Not on file         ALLERGIES: Reglan [metoclopramide]    Review of Systems   Constitutional: Positive for chills. Negative for fever. Gastrointestinal: Positive for diarrhea, nausea and vomiting. All other systems reviewed and are negative. Vitals:    08/18/20 1835   BP: (!) 146/93   Pulse: 83   Resp: 16   Temp: 99.6 °F (37.6 °C)   SpO2: 97%   Weight: 68 kg (150 lb)   Height: 5' 6\" (1.676 m)            Physical Exam  Vitals signs and nursing note reviewed. Constitutional:       Appearance: Normal appearance. She is well-developed. HENT:      Head: Normocephalic and atraumatic. Eyes:      Conjunctiva/sclera: Conjunctivae normal.      Pupils: Pupils are equal, round, and reactive to light. Neck:      Musculoskeletal: Normal range of motion and neck supple. Cardiovascular:      Rate and Rhythm: Normal rate and regular rhythm. Pulmonary:      Effort: Pulmonary effort is normal. No respiratory distress. Abdominal:      General: Bowel sounds are normal. There is no distension. Palpations: Abdomen is soft. Musculoskeletal:         General: No tenderness. Right lower leg: No edema. Left lower leg: No edema. Skin:     General: Skin is warm and dry. Neurological:      Mental Status: She is alert and oriented to person, place, and time.    Psychiatric:         Behavior: Behavior normal.          MDM  Number of Diagnoses or Management Options  Gastroenteritis, acute: new and does not require workup     Amount and/or Complexity of Data Reviewed  Clinical lab tests: ordered    Risk of Complications, Morbidity, and/or Mortality  Presenting problems: moderate  Diagnostic procedures: moderate  Management options: moderate    Patient Progress  Patient progress: stable         Procedures

## 2020-08-18 NOTE — ED NOTES
I have reviewed discharge instructions with the patient. The patient verbalized understanding. Patient left ED via Discharge Method: ambulatory to Home with self. Opportunity for questions and clarification provided. Patient given 2 scripts. To continue your aftercare when you leave the hospital, you may receive an automated call from our care team to check in on how you are doing. This is a free service and part of our promise to provide the best care and service to meet your aftercare needs.  If you have questions, or wish to unsubscribe from this service please call [phone]. Thank you for Choosing our [facility] Emergency Department.

## 2020-08-18 NOTE — Clinical Note
Kurt Leonora was seen and treated in our emergency department on 8/18/2020. She may return to work on .

## 2020-08-18 NOTE — LETTER
[address] [facility] EMERGENCY DEPT 
[address] [facility] [address] 
[phone] Work/School Note Date: 8/18/2020 To Whom It May concern: 
 
Lyle Estrada was seen and treated today in the emergency room by the following provider(s): 
Attending Provider: Daniel Mckeon MD. Lyle Estrada Special Instructions:  Ms. Shima Serrano was tested for COVID-19 today. This test will take anywhere from 2 to 5 days to result.  
 
Sincerely, 
 
 
 
 
Fredrick Cox MD

## 2020-08-18 NOTE — DISCHARGE INSTRUCTIONS
Follow up with one of the doctors listed. Return to the ER if your symptoms worsen. [address] [facility] [address]  [place]  [facility]  [phone] [id number] [phone]    Advance Care Planning  People with COVID-19 may have no symptoms, mild symptoms, such as fever, cough, and shortness of breath or they may have more severe illness, developing severe and fatal pneumonia. As a result, Advance Care Planning with attention to naming a health care decision maker (someone you trust to make healthcare decisions for you if you could not speak for yourself) and sharing other health care preferences is important BEFORE a possible health crisis. Please contact your Primary Care Provider to discuss Advance Care Planning. Preventing the Spread of Coronavirus Disease 2019 in Homes and Residential Communities  For the most recent information go to [email or web address]    Prevention steps for People with confirmed or suspected COVID-19 (including persons under investigation) who do not need to be hospitalized  and   People with confirmed COVID-19 who were hospitalized and determined to be medically stable to go home    Your healthcare provider and public health staff will evaluate whether you can be cared for at home. If it is determined that you do not need to be hospitalized and can be isolated at home, you will be monitored by staff from your local or state health department. You should follow the prevention steps below until a healthcare provider or local or state health department says you can return to your normal activities. Stay home except to get medical care  People who are mildly ill with COVID-19 are able to isolate at home during their illness. You should restrict activities outside your home, except for getting medical care. Do not go to work, school, or public areas.  Avoid using public transportation, ride-sharing, or taxis. Separate yourself from other people and animals in your home  People: As much as possible, you should stay in a specific room and away from other people in your home. Also, you should use a separate bathroom, if available. Animals: You should restrict contact with pets and other animals while you are sick with COVID-19, just like you would around other people. Although there have not been reports of pets or other animals becoming sick with COVID-19, it is still recommended that people sick with COVID-19 limit contact with animals until more information is known about the virus. When possible, have another member of your household care for your animals while you are sick. If you are sick with COVID-19, avoid contact with your pet, including petting, snuggling, being kissed or licked, and sharing food. If you must care for your pet or be around animals while you are sick, wash your hands before and after you interact with pets and wear a facemask. Call ahead before visiting your doctor  If you have a medical appointment, call the healthcare provider and tell them that you have or may have COVID-19. This will help the healthcare providers office take steps to keep other people from getting infected or exposed. Wear a facemask  You should wear a facemask when you are around other people (e.g., sharing a room or vehicle) or pets and before you enter a healthcare providers office. If you are not able to wear a facemask (for example, because it causes trouble breathing), then people who live with you should not stay in the same room with you, or they should wear a facemask if they enter your room. Cover your coughs and sneezes  Cover your mouth and nose with a tissue when you cough or sneeze. Throw used tissues in a lined trash can.  Immediately wash your hands with soap and water for at least 20 seconds or, if soap and water are not available, clean your hands with an alcohol-based hand  that contains at least 60% alcohol. Clean your hands often  Wash your hands often with soap and water for at least 20 seconds, especially after blowing your nose, coughing, or sneezing; going to the bathroom; and before eating or preparing food. If soap and water are not readily available, use an alcohol-based hand  with at least 60% alcohol, covering all surfaces of your hands and rubbing them together until they feel dry. Soap and water are the best option if hands are visibly dirty. Avoid touching your eyes, nose, and mouth with unwashed hands. Avoid sharing personal household items  You should not share dishes, drinking glasses, cups, eating utensils, towels, or bedding with other people or pets in your home. After using these items, they should be washed thoroughly with soap and water. Clean all high-touch surfaces everyday  High touch surfaces include counters, tabletops, doorknobs, bathroom fixtures, toilets, phones, keyboards, tablets, and bedside tables. Also, clean any surfaces that may have blood, stool, or body fluids on them. Use a household cleaning spray or wipe, according to the label instructions. Labels contain instructions for safe and effective use of the cleaning product including precautions you should take when applying the product, such as wearing gloves and making sure you have good ventilation during use of the product. Monitor your symptoms  Seek prompt medical attention if your illness is worsening (e.g., difficulty breathing). Before seeking care, call your healthcare provider and tell them that you have, or are being evaluated for, COVID-19. Put on a facemask before you enter the facility. These steps will help the healthcare providers office to keep other people in the office or waiting room from getting infected or exposed. Ask your healthcare provider to call the local or state health department.  Persons who are placed under active monitoring or facilitated self-monitoring should follow instructions provided by their local health department or occupational health professionals, as appropriate. When working with your local health department check their available hours. If you have a medical emergency and need to call 911, notify the dispatch personnel that you have, or are being evaluated for COVID-19. If possible, put on a facemask before emergency medical services arrive. Discontinuing home isolation  Patients with confirmed COVID-19 should remain under home isolation precautions until the risk of secondary transmission to others is thought to be low. The decision to discontinue home isolation precautions should be made on a case-by-case basis, in consultation with healthcare providers and state and local health departments.

## 2020-08-19 ENCOUNTER — PATIENT OUTREACH (OUTPATIENT)
Dept: CASE MANAGEMENT | Age: 30
End: 2020-08-19

## 2020-08-19 LAB
SARS COV-2, XPGCVT: NEGATIVE
SOURCE, COVRS: NORMAL

## 2020-08-19 NOTE — PROGRESS NOTES
This note will not be viewable in [address] 1st Attempt to contact patient for [facility] call, no answer, unable to leave VM.  Will attempt to contact patient again within 24 hours

## 2020-08-20 ENCOUNTER — PATIENT OUTREACH (OUTPATIENT)
Dept: CASE MANAGEMENT | Age: 30
End: 2020-08-20

## 2020-08-20 NOTE — PROGRESS NOTES
This note will not be viewable in [address] 2nd attempt to contact patient for [facility] Call. No answer, left  for returned call. Episode will be closed at this time as Care Coordinator has been unsuccessful in reaching the patient.

## 2020-08-21 ENCOUNTER — HOSPITAL ENCOUNTER (EMERGENCY)
Age: 30
Discharge: LEFT BEFORE DISCHARGE | End: 2020-08-21

## 2020-08-21 VITALS
HEIGHT: 66 IN | TEMPERATURE: 98.6 F | SYSTOLIC BLOOD PRESSURE: 137 MMHG | RESPIRATION RATE: 16 BRPM | DIASTOLIC BLOOD PRESSURE: 100 MMHG | WEIGHT: 150 LBS | BODY MASS INDEX: 24.11 KG/M2 | OXYGEN SATURATION: 94 % | HEART RATE: 88 BPM

## 2020-08-21 PROCEDURE — 75810000275 HC EMERGENCY DEPT VISIT NO LEVEL OF CARE

## 2020-08-21 NOTE — ED TRIAGE NOTES
Pt states when she woke up this morning she couldn't see out of left eye. Pt has large red spot in eye when she looks down. No trauma or injury known.

## 2023-04-24 ENCOUNTER — HOSPITAL ENCOUNTER (EMERGENCY)
Age: 33
Discharge: HOME | End: 2023-04-24
Attending: EMERGENCY MEDICINE

## 2023-04-24 VITALS
DIASTOLIC BLOOD PRESSURE: 95 MMHG | RESPIRATION RATE: 18 BRPM | HEART RATE: 87 BPM | OXYGEN SATURATION: 100 % | TEMPERATURE: 99 F | HEIGHT: 65 IN | WEIGHT: 165 LBS | BODY MASS INDEX: 27.49 KG/M2 | SYSTOLIC BLOOD PRESSURE: 157 MMHG

## 2023-04-24 DIAGNOSIS — L02.419 AXILLARY ABSCESS: Primary | ICD-10-CM

## 2023-04-24 PROCEDURE — 99283 EMERGENCY DEPT VISIT LOW MDM: CPT

## 2023-04-24 RX ORDER — DOXYCYCLINE HYCLATE 100 MG
100 TABLET ORAL 2 TIMES DAILY
Qty: 20 TABLET | Refills: 0 | Status: SHIPPED | OUTPATIENT
Start: 2023-04-24 | End: 2023-05-04

## 2023-04-24 ASSESSMENT — ENCOUNTER SYMPTOMS
GASTROINTESTINAL NEGATIVE: 1
RESPIRATORY NEGATIVE: 1

## 2023-04-24 ASSESSMENT — PAIN - FUNCTIONAL ASSESSMENT: PAIN_FUNCTIONAL_ASSESSMENT: 0-10

## 2023-04-24 ASSESSMENT — PAIN SCALES - GENERAL: PAINLEVEL_OUTOF10: 10

## 2023-04-24 NOTE — ED PROVIDER NOTES
Emergency Department Provider Note       PCP: None None   Age: 27 y.o. Sex: female     DISPOSITION Decision To Discharge 04/24/2023 05:09:14 PM       ICD-10-CM    1. Axillary abscess  L02.419           Medical Decision Making     Complexity of Problems Addressed:  1 acute illness    Data Reviewed and Analyzed:  Category 1:   I independently ordered and reviewed each unique test.         Category 2:       Category 3: Discussion of management or test interpretation. patient presents with bilateral axillary discomfort with drainage from the left axilla and more pain from that area. Exam consistent with hidradenitis suppurativa with acute infection on the left with small abscess that is already open and draining. I do not appreciate anything requiring I&D at this time. Will place on doxycycline. She is to follow-up closely with her primary doctor. She is to return if worsening in any way. Encouraged Tylenol and ibuprofen for pain control. Encourage warm soaks frequently. Risk of Complications and/or Morbidity of Patient Management:  Prescription drug management performed. History      Josefina Law is a 27 y.o. female who presents to the Emergency Department with chief complaint of    Chief Complaint   Patient presents with    Skin Problem      Patient is a 68-year-old female who presents with bilateral axillary discomfort for several months. Her left axilla is worse than the right. It is draining pus. She has been doing warm soaks, avoiding any deodorant. Has not seen anybody for this. Denies issues in any other areas. No other acute complaints. Review of Systems   Constitutional: Negative. HENT: Negative. Respiratory: Negative. Cardiovascular: Negative. Gastrointestinal: Negative. Genitourinary: Negative. Skin:  Positive for wound. All other systems reviewed and are negative.     Physical Exam     Vitals signs and nursing note reviewed:  Patient Vitals for

## 2023-04-24 NOTE — ED TRIAGE NOTES
Pt ambulatory to triage with CO red draining bumps under bilateral axilla that have been progressively getting worse x several months. Denies fevers/ chills.

## 2023-06-01 ENCOUNTER — HOSPITAL ENCOUNTER (EMERGENCY)
Age: 33
Discharge: HOME | End: 2023-06-01
Attending: EMERGENCY MEDICINE

## 2023-06-01 VITALS
TEMPERATURE: 98.8 F | WEIGHT: 165 LBS | OXYGEN SATURATION: 99 % | RESPIRATION RATE: 17 BRPM | HEART RATE: 92 BPM | SYSTOLIC BLOOD PRESSURE: 145 MMHG | DIASTOLIC BLOOD PRESSURE: 91 MMHG | BODY MASS INDEX: 27.49 KG/M2 | HEIGHT: 65 IN

## 2023-06-01 DIAGNOSIS — L73.2 HIDRADENITIS AXILLARIS: Primary | ICD-10-CM

## 2023-06-01 DIAGNOSIS — J01.00 ACUTE NON-RECURRENT MAXILLARY SINUSITIS: ICD-10-CM

## 2023-06-01 DIAGNOSIS — J02.9 ACUTE PHARYNGITIS, UNSPECIFIED ETIOLOGY: ICD-10-CM

## 2023-06-01 LAB — STREP, MOLECULAR: NOT DETECTED

## 2023-06-01 PROCEDURE — 87651 STREP A DNA AMP PROBE: CPT

## 2023-06-01 PROCEDURE — 99283 EMERGENCY DEPT VISIT LOW MDM: CPT

## 2023-06-01 RX ORDER — CEPHALEXIN 500 MG/1
500 CAPSULE ORAL 3 TIMES DAILY
Qty: 30 CAPSULE | Refills: 0 | Status: SHIPPED | OUTPATIENT
Start: 2023-06-01 | End: 2023-06-11

## 2023-06-01 ASSESSMENT — PAIN DESCRIPTION - LOCATION: LOCATION: THROAT

## 2023-06-01 ASSESSMENT — ENCOUNTER SYMPTOMS
COLOR CHANGE: 1
SORE THROAT: 1
VOICE CHANGE: 1

## 2023-06-01 ASSESSMENT — LIFESTYLE VARIABLES
HOW MANY STANDARD DRINKS CONTAINING ALCOHOL DO YOU HAVE ON A TYPICAL DAY: PATIENT DOES NOT DRINK
HOW OFTEN DO YOU HAVE A DRINK CONTAINING ALCOHOL: NEVER

## 2023-06-01 ASSESSMENT — PAIN DESCRIPTION - DESCRIPTORS: DESCRIPTORS: ACHING;DISCOMFORT;SORE

## 2023-06-01 ASSESSMENT — PAIN DESCRIPTION - FREQUENCY: FREQUENCY: CONTINUOUS

## 2023-06-01 ASSESSMENT — PAIN - FUNCTIONAL ASSESSMENT: PAIN_FUNCTIONAL_ASSESSMENT: 0-10

## 2023-06-01 ASSESSMENT — PAIN DESCRIPTION - PAIN TYPE: TYPE: ACUTE PAIN

## 2023-06-01 ASSESSMENT — PAIN SCALES - GENERAL: PAINLEVEL_OUTOF10: 7

## 2023-06-01 NOTE — ED NOTES
I have reviewed discharge instructions with the patient. The patient verbalized understanding. Patient left ED via Discharge Method: ambulatory to Home [facility] for questions and clarification provided. Patient given 1 scripts. To continue your aftercare when you leave the hospital, you may receive an automated call from our care team to check in on how you are doing. This is a free service and part of our promise to provide the best care and service to meet your aftercare needs.  If you have questions, or wish to unsubscribe from this service please call [phone]. Thank you for Choosing our [address] [place] [address] Emergency Department.        Santiago Khanna RN  06/01/23 5855

## 2023-06-01 NOTE — ED TRIAGE NOTES
Pt reports body aches, sinus congestion and sore throat since Monday. Pt also c/o \"bumps\" under armpits and has been taking abx for them but no improvement.    A&Ox4    (-)fever (+)sweats/chills

## 2023-06-01 NOTE — ED PROVIDER NOTES
Emergency Department Provider Note       PCP: None None   Age: 27 y.o. Sex: female     DISPOSITION Decision To Discharge 06/01/2023 03:29:39 PM       ICD-10-CM    1. Hidradenitis axillaris  L73.2       2. Acute pharyngitis, unspecified etiology  J02.9       3. Acute non-recurrent maxillary sinusitis  J01.00           Medical Decision Making     Complexity of Problems Addressed:  1 acute illness    Data Reviewed and Analyzed:  Category 1:   I independently ordered and reviewed each unique test.         Category 2:       Category 3: Discussion of management or test interpretation. DDX:    Viral infection, croup (bronchiolitis), mononucleosis, COVID-19    Acute sinusitis, chronic sinusitis,    OM, serous OM, otitis externa,    Pharyngitis, Strep Throat, adenitis, uvulitis, rhinitis, postnasal drainage, nasal congestion    Bronchitis, pneumonia        Risk of Complications and/or Morbidity of Patient Management:  Prescription drug management performed. ED Course as of 06/01/23 1531   Thu Jun 01, 2023   1528 The patient's rapid strep was negative. Because of her increased sinus pressure and congestion she will be given a prescription for antibiotics to treat her sinusitis. I also talked her about management of her sore throat. [KH]      ED Course User Index  [KH] Lelia Horta,        History      Emmanuel Rosales is a 27 y.o. female who presents to the Emergency Department with chief complaint of    Chief Complaint   Patient presents with    Pharyngitis    Skin Problem      33-year-old female presenting to the emergency department today complaining of a sore throat after returning from [facility]. The patient states that she has not had any fevers. The patient is also complaining of multiple bumps in the axilla bilaterally. This has been constant for the last year fluctuating in intensity and size.   The patient did have a course of doxycycline which she took but she said it did not seem to

## 2023-06-01 NOTE — DISCHARGE INSTRUCTIONS
Use apple cider vinegar gargles with 1/5 apple cider vinegar to 4/5 hot water. Gargle and spit 4-5 times then drink 1 drink. Do this as needed. Use Cepacol spray or throat lozenges as needed. Take Tylenol or Motrin as needed for pain. Take the full course of antibiotics as prescribed. Try to establish care with a dermatologist secondary to your hidradenitis. Talk to them about management of symptoms outpatient. We would love to help you get a primary care doctor for follow-up after your emergency department visit. Please call [phone] between 7AM - 6PM Monday to Friday. A care navigator will be able to assist you with setting up a doctor close to your home.

## 2024-03-09 ENCOUNTER — HOSPITAL ENCOUNTER (EMERGENCY)
Age: 34
Discharge: HOME | End: 2024-03-09

## 2024-03-09 VITALS
OXYGEN SATURATION: 100 % | RESPIRATION RATE: 16 BRPM | TEMPERATURE: 98.7 F | SYSTOLIC BLOOD PRESSURE: 149 MMHG | HEART RATE: 85 BPM | DIASTOLIC BLOOD PRESSURE: 96 MMHG

## 2024-03-09 DIAGNOSIS — L30.9 DERMATITIS: ICD-10-CM

## 2024-03-09 DIAGNOSIS — R21 RASH AND OTHER NONSPECIFIC SKIN ERUPTION: Primary | ICD-10-CM

## 2024-03-09 PROCEDURE — 99284 EMERGENCY DEPT VISIT MOD MDM: CPT

## 2024-03-09 PROCEDURE — 6360000002 HC RX W HCPCS: Performed by: PHYSICIAN ASSISTANT

## 2024-03-09 PROCEDURE — 96372 THER/PROPH/DIAG INJ SC/IM: CPT

## 2024-03-09 PROCEDURE — 2580000003 HC RX 258: Performed by: PHYSICIAN ASSISTANT

## 2024-03-09 RX ORDER — PREDNISONE 50 MG/1
50 TABLET ORAL DAILY
Qty: 5 TABLET | Refills: 0 | Status: SHIPPED | OUTPATIENT
Start: 2024-03-09 | End: 2024-03-14

## 2024-03-09 RX ORDER — TRIAMCINOLONE ACETONIDE 1 MG/G
OINTMENT TOPICAL
Qty: 15 G | Refills: 0 | Status: SHIPPED | OUTPATIENT
Start: 2024-03-09 | End: 2024-03-09

## 2024-03-09 RX ORDER — DIPHENHYDRAMINE HYDROCHLORIDE 50 MG/ML
25 INJECTION INTRAMUSCULAR; INTRAVENOUS
Status: COMPLETED | OUTPATIENT
Start: 2024-03-09 | End: 2024-03-09

## 2024-03-09 RX ORDER — PREDNISONE 50 MG/1
50 TABLET ORAL DAILY
Qty: 5 TABLET | Refills: 0 | Status: SHIPPED | OUTPATIENT
Start: 2024-03-09 | End: 2024-03-09

## 2024-03-09 RX ORDER — TRIAMCINOLONE ACETONIDE 1 MG/G
OINTMENT TOPICAL
Qty: 15 G | Refills: 0 | Status: SHIPPED | OUTPATIENT
Start: 2024-03-09 | End: 2024-03-16

## 2024-03-09 RX ADMIN — DIPHENHYDRAMINE HYDROCHLORIDE 25 MG: 50 INJECTION, SOLUTION INTRAMUSCULAR; INTRAVENOUS at 12:18

## 2024-03-09 RX ADMIN — WATER 125 MG: 1 INJECTION INTRAMUSCULAR; INTRAVENOUS; SUBCUTANEOUS at 12:19

## 2024-03-09 ASSESSMENT — ENCOUNTER SYMPTOMS
GASTROINTESTINAL NEGATIVE: 1
RESPIRATORY NEGATIVE: 1

## 2024-03-09 NOTE — ED NOTES
I have reviewed discharge instructions with the patient.  The patient verbalized understanding.    Patient left ED via Discharge Method: ambulatory to Home with self.    [facility] for questions and clarification provided.       Patient given 5 scripts.         To continue your aftercare when you leave the hospital, you may receive an automated call from our care team to check in on how you are doing.  This is a free service and part of our promise to provide the best care and service to meet your aftercare needs.” If you have questions, or wish to unsubscribe from this service please call [phone].  Thank you for Choosing our [facility] Emergency Department.         Leonardo Mckinney RN  03/09/24 0004

## 2024-03-09 NOTE — DISCHARGE INSTRUCTIONS
Call Derm for close follow up. Return if worsening.     We would love to help you get a primary care doctor for follow-up after your emergency department visit.    Please call [phone] between 7AM - 6PM Monday to Friday.  A care navigator will be able to assist you with setting up a doctor close to your home.        "-- DO NOT REPLY / DO NOT REPLY ALL --  -- Message is from the [employer]--    Order Request  Chest X Aime Storm / reason: She has COVID and was told to get a chest x-ray - she was given no antibiotics. Coughing, difficulty talking, congestion. Insurance type:   Payor: MEDICARE / Plan: PARTA AND B / Product Type: MEDICARE    Preferred Delivery Method   Call when ready for pickup - phone number to notify: [phone]     Caller Information       Type Contact Phone    01/06/2021 09:40 AM CST Phone (Incoming) Candelario Anton (Self) [phone] (M)          Alternative phone number: na    Turnaround time given to caller: ""This message will be sent to [facility] Provider's name]. The clinical team will fulfill your request as soon as they review your message. \""  "

## 2024-03-09 NOTE — ED TRIAGE NOTES
Pt arrives to the ER with c/o Rash on neck and spread throughout the body. Pt states they have had these for years but have exacerbated and spread x 1 week. Pt states itching and burning to areas where rash exists.

## 2024-03-09 NOTE — ED PROVIDER NOTES
Emergency Department Provider Note       PCP: None, None   Age: 33 y.o.   Sex: female     DISPOSITION Decision To Discharge 03/09/2024 11:58:01 AM       ICD-10-CM    1. Rash and other nonspecific skin eruption  R21 ROBBY - Luis Valadez MD PA      2. Dermatitis  L30.9 ROBBY - Luis Valadez MD PA          Medical Decision Making     Patient itching with bilateral erythematous raised rash on her neck.  Will place on steroids.  Will give dose of Benadryl here to just for symptoms.  She is to follow-up closely with Derm and return if worsening in any way.     1 or more acute illnesses that pose a threat to life or bodily function.   Prescription drug management performed.  Patient was discharged risks and benefits of hospitalization were considered.  Shared medical decision making was utilized in creating the patients health plan today.    I independently ordered and reviewed each unique test.                     History     Patient is a 33-year-old female who presents with rash to bilateral neck.  She says she has had this issue for a while, but over the past week it really flared up and she has had increased itching and burning.  She says she cannot sleep because she is itching so much.  Cannot wear necklace.  No recent lotion soaps or detergents.  She denies any history of eczema or psoriasis or any other skin disease.  Has never seen a dermatologist.  Has not taken any medicine for the symptoms.        ROS     Review of Systems   Constitutional: Negative.    HENT: Negative.     Respiratory: Negative.     Cardiovascular: Negative.    Gastrointestinal: Negative.    Genitourinary: Negative.    Skin:  Positive for rash.   All other systems reviewed and are negative.       Physical Exam     Vitals signs and nursing note reviewed:  Vitals:    03/09/24 1149   BP: (!) 146/86   Pulse: 80   Resp: 16   Temp: 99.1 °F (37.3 °C)   TempSrc: Oral   SpO2: 98%      Physical Exam  Vitals and nursing note reviewed.   Constitutional:          Social History     Socioeconomic History    Marital status:    Tobacco Use    Smoking status: Every Day     Current packs/day: 0.10     Types: Cigarettes    Smokeless tobacco: Never   Substance and Sexual Activity    Alcohol use: Yes    Drug use: No        Previous Medications    ONDANSETRON (ZOFRAN-ODT) 4 MG DISINTEGRATING TABLET    Take 4 mg by mouth every 8 hours as needed    PROMETHAZINE (PHENERGAN) 25 MG TABLET    Take 25 mg by mouth every 6 hours as needed        No results found for any visits on 03/09/24.      No orders to display                No results for input(s): \"COVID19\" in the last 72 hours.    Voice dictation software was used during the making of this note.  This software is not perfect and grammatical and other typographical errors may be present.  This note has not been completely proofread for errors.        Karlie Palomares PA  03/09/24 8985

## 2024-07-22 ENCOUNTER — HOSPITAL ENCOUNTER (EMERGENCY)
Age: 34
Discharge: HOME | End: 2024-07-22

## 2024-07-22 VITALS
OXYGEN SATURATION: 100 % | TEMPERATURE: 98.4 F | HEART RATE: 83 BPM | HEIGHT: 66 IN | WEIGHT: 152.4 LBS | RESPIRATION RATE: 17 BRPM | SYSTOLIC BLOOD PRESSURE: 127 MMHG | DIASTOLIC BLOOD PRESSURE: 88 MMHG | BODY MASS INDEX: 24.49 KG/M2

## 2024-07-22 DIAGNOSIS — A59.01 TRICHOMONAS VAGINALIS (TV) INFECTION: Primary | ICD-10-CM

## 2024-07-22 LAB
BILIRUB UR QL: NEGATIVE
GLUCOSE UR QL STRIP.AUTO: NEGATIVE MG/DL
HCG UR QL: NEGATIVE
HCG, URINE, POC: NEGATIVE
KETONES UR-MCNC: NEGATIVE MG/DL
LEUKOCYTE ESTERASE UR QL STRIP: NEGATIVE
Lab: NORMAL
NEGATIVE QC PASS/FAIL: NORMAL
NITRITE UR QL: NEGATIVE
PH UR: 6.5 (ref 5–9)
POSITIVE QC PASS/FAIL: NORMAL
PROT UR QL: NEGATIVE MG/DL
RBC # UR STRIP: NEGATIVE
SERVICE CMNT-IMP: ABNORMAL
SERVICE CMNT-IMP: NORMAL
SP GR UR: >1.03 (ref 1–1.02)
UROBILINOGEN UR QL: 0.2 EU/DL (ref 0.2–1)
WET PREP GENITAL: NORMAL

## 2024-07-22 PROCEDURE — 87591 N.GONORRHOEAE DNA AMP PROB: CPT

## 2024-07-22 PROCEDURE — 87491 CHLMYD TRACH DNA AMP PROBE: CPT

## 2024-07-22 PROCEDURE — 81003 URINALYSIS AUTO W/O SCOPE: CPT

## 2024-07-22 PROCEDURE — 99283 EMERGENCY DEPT VISIT LOW MDM: CPT

## 2024-07-22 PROCEDURE — 87210 SMEAR WET MOUNT SALINE/INK: CPT

## 2024-07-22 PROCEDURE — 81025 URINE PREGNANCY TEST: CPT

## 2024-07-22 RX ORDER — METRONIDAZOLE 500 MG/1
500 TABLET ORAL 2 TIMES DAILY
Qty: 14 TABLET | Refills: 0 | Status: SHIPPED | OUTPATIENT
Start: 2024-07-22 | End: 2024-07-29

## 2024-07-22 NOTE — DISCHARGE INSTRUCTIONS
Your wet prep today was positive for trichomonas.  You need to complete the antibiotic as prescribed for full course of treatment.  It is important that you complete this course of treatment and abstain from intercourse until you have completed treatment.  Any recent partners need to be tested and treated as well to prevent reinfection.  Do not drink any alcohol with this antibiotic as it will cause severe nausea and vomiting    Return to the ER for any new or worsening symptoms.

## 2024-07-22 NOTE — ED PROVIDER NOTES
TOOTH EXTRACTION      uncomplicated        Social History     Socioeconomic History    Marital status:      Spouse name: None    Number of children: None    Years of education: None    Highest education level: None   Tobacco Use    Smoking status: Every Day     Current packs/day: 0.10     Types: Cigarettes    Smokeless tobacco: Never   Substance and Sexual Activity    Alcohol use: Yes    Drug use: No        Previous Medications    ONDANSETRON (ZOFRAN-ODT) 4 MG DISINTEGRATING TABLET    Take 4 mg by mouth every 8 hours as needed    PROMETHAZINE (PHENERGAN) 25 MG TABLET    Take 25 mg by mouth every 6 hours as needed        Results for orders placed or performed during the hospital encounter of 07/22/24   Wet prep, genital    Specimen: Vaginal   Result Value Ref Range    Special Requests NO SPECIAL REQUESTS      Wet Prep NO WBCS SEEN      Wet Prep MODERATE  MOTILE TRICHOMONAS NOTED        Wet Prep CLUE CELLS ABSENT      Wet Prep NO YEAST SEEN     POC Pregnancy Urine Qual   Result Value Ref Range    HCG, Urine, POC Negative Negative    Lot Number n/[id number]     Positive QC Pass/Fail Pass     Negative QC Pass/Fail Pass    POCT Urinalysis no Micro   Result Value Ref Range    Specific Gravity, Urine, POC >1.030 (H) 1.001 - 1.023    pH, Urine, POC 6.5 5.0 - 9.0      Protein, Urine, POC Negative NEG mg/dL    Glucose, UA POC Negative NEG mg/dL    Ketones, Urine, POC Negative NEG mg/dL    Bilirubin, Urine, POC Negative NEG      Blood, UA POC Negative NEG      URINE UROBILINOGEN POC 0.2 0.2 - 1.0 EU/dL    Nitrite, Urine, POC Negative NEG      Leukocyte Est, UA POC Negative NEG      Performed by: Risa Garces    POC Pregnancy Urine Qual   Result Value Ref Range    Preg Test, Ur Negative NEG           No orders to display                No results for input(s): \"COVID19\" in the last 72 hours.     Voice dictation software was used during the making of this note.  This software is not perfect and grammatical and other typographical

## 2024-07-22 NOTE — ED NOTES
I have reviewed discharge instructions with the patient.  The patient verbalized understanding.    Patient left ED via Discharge Method: ambulatory to Home with self.    Opportunity for questions and clarification provided.       Patient given 0 scripts.         To continue your aftercare when you leave the hospital, you may receive an automated call from our care team to check in on how you are doing.  This is a free service and part of our promise to provide the best care and service to meet your aftercare needs.” If you have questions, or wish to unsubscribe from this service please call [phone].  Thank you for Choosing our [facility] Emergency Department.        Deysi Solano LPN  07/22/24 1464

## 2024-07-22 NOTE — ED TRIAGE NOTES
Pt reports intermittent abdominal pain since last week with nausea (-)dysuria, emesis, diarrhea, fevers (+)slight vaginal discharge  Reports possible exposure to STD   LMP 7/1    A&Ox4
